# Patient Record
Sex: FEMALE | Race: BLACK OR AFRICAN AMERICAN | Employment: FULL TIME | URBAN - METROPOLITAN AREA
[De-identification: names, ages, dates, MRNs, and addresses within clinical notes are randomized per-mention and may not be internally consistent; named-entity substitution may affect disease eponyms.]

---

## 2017-01-03 ENCOUNTER — TELEPHONE (OUTPATIENT)
Dept: CARDIOLOGY CLINIC | Age: 47
End: 2017-01-03

## 2017-01-03 NOTE — TELEPHONE ENCOUNTER
The patient requested a call back regarding a possible medication nebivolol (BYSTOLIC) 5 mg tablet change. She can be reached at [phone]. Thanks!

## 2017-01-10 ENCOUNTER — TELEPHONE (OUTPATIENT)
Dept: CARDIOLOGY CLINIC | Age: 47
End: 2017-01-10

## 2017-01-11 NOTE — TELEPHONE ENCOUNTER
Spoke with Patient,    Advised the Bakaribradyn Abler is requiring a PA [id number]. Will call tomorrow to start. Patient to take samples as directed in the mean time.

## 2017-01-16 ENCOUNTER — TELEPHONE (OUTPATIENT)
Dept: CARDIOLOGY CLINIC | Age: 47
End: 2017-01-16

## 2017-01-16 NOTE — TELEPHONE ENCOUNTER
PA Case # [id number]    Patient has been denied for Bystolic 5 mg. Patient has to try another BB Tier one. Ex Core[employer]. Will reach out to Dr. Nazanin Paula for another option.

## 2017-01-18 NOTE — TELEPHONE ENCOUNTER
Unable to leave  for Patient    MD Bandar Ghosh LPN                     If she can get Toprol XL then start 25 MG PO DAILY

## 2017-02-15 ENCOUNTER — TELEPHONE (OUTPATIENT)
Dept: CARDIOLOGY CLINIC | Age: 47
End: 2017-02-15

## 2017-02-15 RX ORDER — METOPROLOL SUCCINATE 25 MG/1
25 TABLET, EXTENDED RELEASE ORAL DAILY
Qty: 30 TAB | Refills: 3 | Status: SHIPPED | OUTPATIENT
Start: 2017-02-15 | End: 2018-01-24 | Stop reason: SDUPTHER

## 2017-03-06 ENCOUNTER — HOSPITAL ENCOUNTER (OUTPATIENT)
Dept: MAMMOGRAPHY | Age: 47
Discharge: HOME | End: 2017-03-06
Attending: SPECIALIST
Payer: COMMERCIAL

## 2017-03-06 DIAGNOSIS — Z12.31 VISIT FOR SCREENING MAMMOGRAM: ICD-10-CM

## 2017-03-06 PROCEDURE — 77063 BREAST TOMOSYNTHESIS BI: CPT

## 2017-12-04 ENCOUNTER — HOSPITAL ENCOUNTER (EMERGENCY)
Age: 47
Discharge: HOME | End: 2017-12-04
Attending: EMERGENCY MEDICINE
Payer: COMMERCIAL

## 2017-12-04 VITALS
SYSTOLIC BLOOD PRESSURE: 157 MMHG | HEART RATE: 75 BPM | OXYGEN SATURATION: 97 % | TEMPERATURE: 98.7 F | RESPIRATION RATE: 18 BRPM | HEIGHT: 67 IN | WEIGHT: 195.11 LBS | BODY MASS INDEX: 30.62 KG/M2 | DIASTOLIC BLOOD PRESSURE: 102 MMHG

## 2017-12-04 DIAGNOSIS — E86.0 DEHYDRATION: ICD-10-CM

## 2017-12-04 DIAGNOSIS — K52.9 GASTROENTERITIS, ACUTE: Primary | ICD-10-CM

## 2017-12-04 LAB
ALBUMIN SERPL-MCNC: 3.3 G/DL (ref 3.5–5)
ALBUMIN/GLOB SERPL: 0.8 {RATIO} (ref 1.1–2.2)
ALP SERPL-CCNC: 64 U/L (ref 45–117)
ALT SERPL-CCNC: 24 U/L (ref 12–78)
ANION GAP SERPL CALC-SCNC: 9 MMOL/L (ref 5–15)
APPEARANCE UR: CLEAR
AST SERPL-CCNC: 23 U/L (ref 15–37)
BACTERIA URNS QL MICRO: NEGATIVE /HPF
BASOPHILS # BLD: 0.1 K/UL (ref 0–0.1)
BASOPHILS NFR BLD: 1 % (ref 0–1)
BILIRUB SERPL-MCNC: 0.3 MG/DL (ref 0.2–1)
BILIRUB UR QL: NEGATIVE
BUN SERPL-MCNC: 3 MG/DL (ref 6–20)
BUN/CREAT SERPL: 3 (ref 12–20)
CALCIUM SERPL-MCNC: 9 MG/DL (ref 8.5–10.1)
CHLORIDE SERPL-SCNC: 105 MMOL/L (ref 97–108)
CO2 SERPL-SCNC: 26 MMOL/L (ref 21–32)
COLOR UR: ABNORMAL
CREAT SERPL-MCNC: 0.87 MG/DL (ref 0.55–1.02)
DIFFERENTIAL METHOD BLD: NORMAL
EOSINOPHIL # BLD: 0 K/UL (ref 0–0.4)
EOSINOPHIL NFR BLD: 0 % (ref 0–7)
EPITH CASTS URNS QL MICRO: ABNORMAL /LPF
ERYTHROCYTE [DISTWIDTH] IN BLOOD BY AUTOMATED COUNT: 12.9 % (ref 11.5–14.5)
GLOBULIN SER CALC-MCNC: 4.3 G/DL (ref 2–4)
GLUCOSE SERPL-MCNC: 115 MG/DL (ref 65–100)
GLUCOSE UR STRIP.AUTO-MCNC: NEGATIVE MG/DL
HCT VFR BLD AUTO: 40.4 % (ref 35–47)
HGB BLD-MCNC: 13.7 G/DL (ref 11.5–16)
HGB UR QL STRIP: ABNORMAL
HYALINE CASTS URNS QL MICRO: ABNORMAL /LPF (ref 0–5)
KETONES UR QL STRIP.AUTO: NEGATIVE MG/DL
LEUKOCYTE ESTERASE UR QL STRIP.AUTO: NEGATIVE
LIPASE SERPL-CCNC: 90 U/L (ref 73–393)
LYMPHOCYTES # BLD: 2.6 K/UL (ref 0.8–3.5)
LYMPHOCYTES NFR BLD: 31 % (ref 12–49)
MCH RBC QN AUTO: 28.8 PG (ref 26–34)
MCHC RBC AUTO-ENTMCNC: 33.9 G/DL (ref 30–36.5)
MCV RBC AUTO: 85.1 FL (ref 80–99)
MONOCYTES # BLD: 0.5 K/UL (ref 0–1)
MONOCYTES NFR BLD: 6 % (ref 5–13)
NEUTS SEG # BLD: 5.3 K/UL (ref 1.8–8)
NEUTS SEG NFR BLD: 62 % (ref 32–75)
NITRITE UR QL STRIP.AUTO: NEGATIVE
PH UR STRIP: 7 [PH] (ref 5–8)
PLATELET # BLD AUTO: 380 K/UL (ref 150–400)
POTASSIUM SERPL-SCNC: 3.4 MMOL/L (ref 3.5–5.1)
PROT SERPL-MCNC: 7.6 G/DL (ref 6.4–8.2)
PROT UR STRIP-MCNC: NEGATIVE MG/DL
RBC # BLD AUTO: 4.75 M/UL (ref 3.8–5.2)
RBC #/AREA URNS HPF: ABNORMAL /HPF (ref 0–5)
RBC MORPH BLD: NORMAL
SODIUM SERPL-SCNC: 140 MMOL/L (ref 136–145)
SP GR UR REFRACTOMETRY: <1.005 (ref 1–1.03)
UR CULT HOLD, URHOLD: NORMAL
UROBILINOGEN UR QL STRIP.AUTO: 0.2 EU/DL (ref 0.2–1)
WBC # BLD AUTO: 8.5 K/UL (ref 3.6–11)
WBC MORPH BLD: NORMAL
WBC URNS QL MICRO: ABNORMAL /HPF (ref 0–4)

## 2017-12-04 PROCEDURE — 83690 ASSAY OF LIPASE: CPT | Performed by: EMERGENCY MEDICINE

## 2017-12-04 PROCEDURE — 99283 EMERGENCY DEPT VISIT LOW MDM: CPT

## 2017-12-04 PROCEDURE — 96361 HYDRATE IV INFUSION ADD-ON: CPT

## 2017-12-04 PROCEDURE — 74011250636 HC RX REV CODE- 250/636: Performed by: EMERGENCY MEDICINE

## 2017-12-04 PROCEDURE — 85025 COMPLETE CBC W/AUTO DIFF WBC: CPT | Performed by: EMERGENCY MEDICINE

## 2017-12-04 PROCEDURE — 96374 THER/PROPH/DIAG INJ IV PUSH: CPT

## 2017-12-04 PROCEDURE — 36415 COLL VENOUS BLD VENIPUNCTURE: CPT | Performed by: EMERGENCY MEDICINE

## 2017-12-04 PROCEDURE — 81001 URINALYSIS AUTO W/SCOPE: CPT | Performed by: EMERGENCY MEDICINE

## 2017-12-04 PROCEDURE — 80053 COMPREHEN METABOLIC PANEL: CPT | Performed by: EMERGENCY MEDICINE

## 2017-12-04 RX ORDER — SODIUM CHLORIDE 0.9 % (FLUSH) 0.9 %
5-10 SYRINGE (ML) INJECTION EVERY 8 HOURS
Status: DISCONTINUED | OUTPATIENT
Start: 2017-12-04 | End: 2017-12-04 | Stop reason: HOSPADM

## 2017-12-04 RX ORDER — DICYCLOMINE HYDROCHLORIDE 20 MG/1
20 TABLET ORAL EVERY 6 HOURS
Qty: 20 TAB | Refills: 0 | Status: SHIPPED | OUTPATIENT
Start: 2017-12-04 | End: 2017-12-09

## 2017-12-04 RX ORDER — ONDANSETRON 4 MG/1
4 TABLET, ORALLY DISINTEGRATING ORAL
Qty: 10 TAB | Refills: 0 | Status: SHIPPED | OUTPATIENT
Start: 2017-12-04 | End: 2019-05-16

## 2017-12-04 RX ORDER — ONDANSETRON 2 MG/ML
4 INJECTION INTRAMUSCULAR; INTRAVENOUS
Status: COMPLETED | OUTPATIENT
Start: 2017-12-04 | End: 2017-12-04

## 2017-12-04 RX ORDER — SODIUM CHLORIDE 0.9 % (FLUSH) 0.9 %
5-10 SYRINGE (ML) INJECTION AS NEEDED
Status: DISCONTINUED | OUTPATIENT
Start: 2017-12-04 | End: 2017-12-04 | Stop reason: HOSPADM

## 2017-12-04 RX ORDER — DICYCLOMINE HYDROCHLORIDE 10 MG/1
20 CAPSULE ORAL
Status: DISCONTINUED | OUTPATIENT
Start: 2017-12-04 | End: 2017-12-04 | Stop reason: HOSPADM

## 2017-12-04 RX ADMIN — ONDANSETRON 4 MG: 2 INJECTION INTRAMUSCULAR; INTRAVENOUS at 04:28

## 2017-12-04 RX ADMIN — SODIUM CHLORIDE 1000 ML: 900 INJECTION, SOLUTION INTRAVENOUS at 04:28

## 2017-12-04 NOTE — ED TRIAGE NOTES
Patient arrives with c/o diarrhea yesterday, weakness and dizziness today accompanied by night sweats x 2 nights; recent travel to [facility] within the week. Patient states her travel partners have also had the \"[place] tummy bug\" or \"Pharadhaoah's Revenge\".

## 2017-12-04 NOTE — DISCHARGE INSTRUCTIONS
We hope that we have addressed all of your medical concerns. The examination and treatment you received in the Emergency Department were for an emergent problem and were not intended as complete care. It is important that you follow up with your healthcare provider(s) for ongoing care. If your symptoms worsen or do not improve as expected, and you are unable to reach your usual health care provider(s), you should return to the Emergency Department. Today's healthcare is undergoing tremendous change, and patient satisfaction surveys are one of the many tools to assess the quality of medical care. You may receive a survey from the [employer] regarding your experience in the Emergency Department. I hope that your experience has been completely positive, particularly the medical care that I provided. As such, please participate in the survey; anything less than excellent does not meet my expectations or intentions. [facility], later [facility] and [facility] participate in nationally recognized quality of care measures. If your blood pressure is greater than 120/80, as reported below, we urge that you seek medical care to address the potential of high blood pressure, commonly known as hypertension. Hypertension can be hereditary or can be caused by certain medical conditions, pain, stress, or \"white coat syndrome. \"       Please make an appointment with your health care provider(s) for follow up of your Emergency Department visit. VITALS:   Patient Vitals for the past 8 hrs:   Temp Pulse Resp BP SpO2   12/04/17 0545 - - - (!) 157/102 -   12/04/17 0530 - - - (!) 155/104 -   12/04/17 0515 - - - (!) 169/97 -   12/04/17 0500 - - - (!) 159/94 -   12/04/17 0445 - - - (!) 162/91 -   12/04/17 0355 98.7 °F (37.1 °C) 75 18 (!) 199/101 97 %          Thank you for allowing us to provide you with medical care today.   We realize that you have many choices for your emergency care needs. Please choose us in the future for any continued health care needs. Mónica Hernández [address] [facility] [place]: [phone]            Recent Results (from the past 24 hour(s))   METABOLIC PANEL, COMPREHENSIVE    Collection Time: 12/04/17  4:14 AM   Result Value Ref Range    Sodium 140 136 - 145 mmol/L    Potassium 3.4 (L) 3.5 - 5.1 mmol/L    Chloride 105 97 - 108 mmol/L    CO2 26 21 - 32 mmol/L    Anion gap 9 5 - 15 mmol/L    Glucose 115 (H) 65 - 100 mg/dL    BUN 3 (L) 6 - 20 MG/DL    Creatinine 0.87 0.55 - 1.02 MG/DL    BUN/Creatinine ratio 3 (L) 12 - 20      GFR est AA >60 >60 ml/min/1.73m2    GFR est non-AA >60 >60 ml/min/1.73m2    Calcium 9.0 8.5 - 10.1 MG/DL    Bilirubin, total 0.3 0.2 - 1.0 MG/DL    ALT (SGPT) 24 12 - 78 U/L    AST (SGOT) 23 15 - 37 U/L    Alk. phosphatase 64 45 - 117 U/L    Protein, total 7.6 6.4 - 8.2 g/dL    Albumin 3.3 (L) 3.5 - 5.0 g/dL    Globulin 4.3 (H) 2.0 - 4.0 g/dL    A-G Ratio 0.8 (L) 1.1 - 2.2     CBC WITH AUTOMATED DIFF    Collection Time: 12/04/17  4:14 AM   Result Value Ref Range    WBC 8.5 3.6 - 11.0 K/uL    RBC 4.75 3.80 - 5.20 M/uL    HGB 13.7 11.5 - 16.0 g/dL    HCT 40.4 35.0 - 47.0 %    MCV 85.1 80.0 - 99.0 FL    MCH 28.8 26.0 - 34.0 PG    MCHC 33.9 30.0 - 36.5 g/dL    RDW 12.9 11.5 - 14.5 %    PLATELET 468 220 - 117 K/uL    NEUTROPHILS 62 32 - 75 %    LYMPHOCYTES 31 12 - 49 %    MONOCYTES 6 5 - 13 %    EOSINOPHILS 0 0 - 7 %    BASOPHILS 1 0 - 1 %    ABS. NEUTROPHILS 5.3 1.8 - 8.0 K/UL    ABS. LYMPHOCYTES 2.6 0.8 - 3.5 K/UL    ABS. MONOCYTES 0.5 0.0 - 1.0 K/UL    ABS. EOSINOPHILS 0.0 0.0 - 0.4 K/UL    ABS.  BASOPHILS 0.1 0.0 - 0.1 K/UL    DF SMEAR SCANNED      RBC COMMENTS NORMOCYTIC, NORMOCHROMIC      WBC COMMENTS ATYPICAL LYMPHOCYTES PRESENT     LIPASE    Collection Time: 12/04/17  4:14 AM   Result Value Ref Range    Lipase 90 73 - 393 U/L   URINALYSIS W/MICROSCOPIC    Collection Time: 12/04/17  4:15 AM   Result Value Ref Range    Color YELLOW/STRAW      Appearance CLEAR CLEAR      Specific gravity <1.005 1.003 - 1.030    pH (UA) 7.0 5.0 - 8.0      Protein NEGATIVE  NEG mg/dL    Glucose NEGATIVE  NEG mg/dL    Ketone NEGATIVE  NEG mg/dL    Bilirubin NEGATIVE  NEG      Blood MODERATE (A) NEG      Urobilinogen 0.2 0.2 - 1.0 EU/dL    Nitrites NEGATIVE  NEG      Leukocyte Esterase NEGATIVE  NEG      WBC 0-4 0 - 4 /hpf    RBC 5-10 0 - 5 /hpf    Epithelial cells FEW FEW /lpf    Bacteria NEGATIVE  NEG /hpf    Hyaline cast 0-2 0 - 5 /lpf   URINE CULTURE HOLD SAMPLE    Collection Time: 12/04/17  4:15 AM   Result Value Ref Range    Urine culture hold URINE ON HOLD IN MICROBIOLOGY DEPT FOR 3 DAYS         No results found. Gastroenteritis: Care Instructions  Your Care Instructions    Gastroenteritis is an illness that may cause nausea, vomiting, and diarrhea. It is sometimes called \"stomach flu. \" It can be caused by bacteria or a virus. You will probably begin to feel better in 1 to 2 days. In the meantime, get plenty of rest and make sure you do not become dehydrated. Dehydration occurs when your body loses too much fluid. Follow-up care is a key part of your treatment and safety. Be sure to make and go to all appointments, and call your doctor if you are having problems. It's also a good idea to know your test results and keep a list of the medicines you take. How can you care for yourself at home? · If your doctor prescribed antibiotics, take them as directed. Do not stop taking them just because you feel better. You need to take the full course of antibiotics. · Drink plenty of fluids to prevent dehydration, enough so that your urine is light yellow or clear like water. Choose water and other caffeine-free clear liquids until you feel better. If you have kidney, heart, or liver disease and have to limit fluids, talk with your doctor before you increase your fluid intake.   · Drink fluids slowly, in frequent, small amounts, because drinking too much too fast can cause vomiting. · Begin eating mild foods, such as dry toast, yogurt, applesauce, bananas, and rice. Avoid spicy, hot, or high-fat foods, and do not drink alcohol or caffeine for a day or two. Do not drink milk or eat ice cream until you are feeling better. How to prevent gastroenteritis  · Keep hot foods hot and cold foods cold. · Do not eat meats, dressings, salads, or other foods that have been kept at room temperature for more than 2 hours. · Use a thermometer to check your refrigerator. It should be between 34°F and 40°F.  · Defrost meats in the refrigerator or microwave, not on the kitchen counter. · Keep your hands and your kitchen clean. Wash your hands, cutting boards, and countertops with hot soapy water frequently. · Cook meat until it is well done. · Do not eat raw eggs or uncooked sauces made with raw eggs. · Do not take chances. If food looks or tastes spoiled, throw it out. When should you call for help? Call 911 anytime you think you may need emergency care. For example, call if:  ? · You vomit blood or what looks like coffee grounds. ? · You passed out (lost consciousness). ? · You pass maroon or very bloody stools. ?Call your doctor now or seek immediate medical care if:  ? · You have severe belly pain. ? · You have signs of needing more fluids. You have sunken eyes, a dry mouth, and pass only a little dark urine. ? · You feel like you are going to faint. ? · You have increased belly pain that does not go away in 1 to 2 days. ? · You have new or increased nausea, or you are vomiting. ? · You have a new or higher fever. ? · Your stools are black and tarlike or have streaks of blood. ? Watch closely for changes in your health, and be sure to contact your doctor if:  ? · You are dizzy or lightheaded. ? · You urinate less than usual, or your urine is dark yellow or brown.    ? · You do not feel better with each day that goes by. Where can you learn more? Go to http://brenda-katy.info/. Enter N142 in the search box to learn more about \"Gastroenteritis: Care Instructions. \"  Current as of: March 3, 2017  Content Version: 11.4  © 9800-9312 [employer]. Care instructions adapted under license by [employer] (which disclaims liability or warranty for this information). If you have questions about a medical condition or this instruction, always ask your healthcare professional. [place] [address] any warranty or liability for your use of this information. Dehydration: Care Instructions  Your Care Instructions  Dehydration happens when your body loses too much fluid. This might happen when you do not drink enough water or you lose large amounts of fluids from your body because of diarrhea, vomiting, or sweating. Severe dehydration can be life-threatening. Water and minerals called electrolytes help put your body fluids back in balance. Learn the early signs of fluid loss, and drink more fluids to prevent dehydration. Follow-up care is a key part of your treatment and safety. Be sure to make and go to all appointments, and call your doctor if you are having problems. It's also a good idea to know your test results and keep a list of the medicines you take. How can you care for yourself at home? · To prevent dehydration, drink plenty of fluids, enough so that your urine is light yellow or clear like water. Choose water and other caffeine-free clear liquids until you feel better. If you have kidney, heart, or liver disease and have to limit fluids, talk with your doctor before you increase the amount of fluids you drink. · If you do not feel like eating or drinking, try taking small sips of water, sports drinks, or other rehydration drinks.   · Get plenty of rest.  To prevent dehydration  · Add more fluids to your diet and daily routine, unless your doctor has told you not to. · During hot weather, drink more fluids. Drink even more fluids if you exercise a lot. Stay away from drinks with alcohol or caffeine. · Watch for the symptoms of dehydration. These include:  ¨ A dry, sticky mouth. ¨ Dark yellow urine, and not much of it. ¨ Dry and sunken eyes. ¨ Feeling very tired. · Learn what problems can lead to dehydration. These include:  ¨ Diarrhea, fever, and vomiting. ¨ Any illness with a fever, such as pneumonia or the flu. ¨ Activities that cause heavy sweating, such as endurance races and heavy outdoor work in hot or humid weather. ¨ Alcohol or drug abuse or withdrawal.  ¨ Certain medicines, such as cold and allergy pills (antihistamines), diet pills (diuretics), and laxatives. ¨ Certain diseases, such as diabetes, cancer, and heart or kidney disease. When should you call for help? Call 911 anytime you think you may need emergency care. For example, call if:  ? · You passed out (lost consciousness). ?Call your doctor now or seek immediate medical care if:  ? · You are confused and cannot think clearly. ? · You are dizzy or lightheaded, or you feel like you may faint. ? · You have signs of needing more fluids. You have sunken eyes and a dry mouth, and you pass only a little dark urine. ? · You cannot keep fluids down. ? Watch closely for changes in your health, and be sure to contact your doctor if:  ? · You are not making tears. ? · Your skin is very dry and sags slowly back into place after you pinch it. ? · Your mouth and eyes are very dry. Where can you learn more? Go to http://brenda-katy.info/. Enter Y671 in the search box to learn more about \"Dehydration: Care Instructions. \"  Current as of: March 20, 2017  Content Version: 11.4  © 7245-0407 [employer]. Care instructions adapted under license by [employer] (which disclaims liability or warranty for this information).  If you have questions about a medical condition or this instruction, always ask your healthcare professional. [address] any warranty or liability for your use of this information. Oral Rehydration: Care Instructions  Your Care Instructions    Dehydration occurs when your body loses too much water. This can happen if you do not drink enough fluids or lose a lot of fluid due to diarrhea, vomiting, or sweating. Being dehydrated can cause health problems and can even be life-threatening. To replace lost fluids, you need to drink liquid that contains special chemicals called electrolytes. Electrolytes keep your body working well. Plain water does not have electrolytes. You also need to rest to prevent more fluid loss. Replacing water and electrolytes (oral rehydration) completely takes about 36 hours. But you should feel better within a few hours. Follow-up care is a key part of your treatment and safety. Be sure to make and go to all appointments, and call your doctor if you are having problems. It's also a good idea to know your test results and keep a list of the medicines you take. How can you care for yourself at home? · Take frequent sips of a drink such as Gatorade, Powerade, or other rehydration drinks that your doctor suggests. These replace both fluid and important chemicals (electrolytes) you need for balance in your blood. · Drink 2 quarts of cool liquid over 2 to 4 hours. You should have at least 10 glasses of liquid a day to replace lost fluid. If you have kidney, heart, or liver disease and have to limit fluids, talk with your doctor before you increase the amount of fluids you drink. · Make your own drink. Measure everything carefully. The drink may not work well or may even be harmful if the amounts are off. ¨ 1 quart water  ¨ ½ teaspoon salt  ¨ 6 teaspoons sugar  · Do not drink liquid with caffeine, such as coffee and esteban. · Do not drink any alcohol.  It can make you dehydrated. · Drink plenty of fluids, enough so that your urine is light yellow or clear like water. If you have kidney, heart, or liver disease and have to limit fluids, talk with your doctor before you increase the amount of fluids you drink. When should you call for help? Call 911 anytime you think you may need emergency care. For example, call if:  ? · You have signs of severe dehydration, such as:  ¨ You are confused or unable to stay awake. ¨ You passed out (lost consciousness). ?Call your doctor now or seek immediate medical care if:  ? · You still have signs of dehydration. You have sunken eyes and a dry mouth, and you pass only a little dark urine. ? · You are dizzy or lightheaded, or you feel like you may faint. ? · You are not able to keep down fluids. ? Watch closely for changes in your health, and be sure to contact your doctor if:  ? · You do not get better as expected. Where can you learn more? Go to http://brenda-katy.info/. Enter I040 in the search box to learn more about \"Oral Rehydration: Care Instructions. \"  Current as of: March 20, 2017  Content Version: 11.4  © 0789-6819 [employer]. Care instructions adapted under license by [employer] (which disclaims liability or warranty for this information). If you have questions about a medical condition or this instruction, always ask your healthcare professional. [place] [address] any warranty or liability for your use of this information.

## 2018-01-24 ENCOUNTER — OFFICE VISIT (OUTPATIENT)
Dept: CARDIOLOGY CLINIC | Age: 48
End: 2018-01-24

## 2018-01-24 VITALS
RESPIRATION RATE: 16 BRPM | HEIGHT: 67 IN | SYSTOLIC BLOOD PRESSURE: 138 MMHG | WEIGHT: 192 LBS | HEART RATE: 69 BPM | DIASTOLIC BLOOD PRESSURE: 76 MMHG | OXYGEN SATURATION: 97 % | BODY MASS INDEX: 30.13 KG/M2

## 2018-01-24 DIAGNOSIS — R03.0 PRE-HYPERTENSION: Primary | ICD-10-CM

## 2018-01-24 RX ORDER — METOPROLOL SUCCINATE 25 MG/1
25 TABLET, EXTENDED RELEASE ORAL DAILY
Qty: 30 TAB | Refills: 3 | Status: SHIPPED | OUTPATIENT
Start: 2018-01-24 | End: 2018-06-18 | Stop reason: SDUPTHER

## 2018-01-24 RX ORDER — METOPROLOL SUCCINATE 25 MG/1
TABLET, EXTENDED RELEASE ORAL
Qty: 30 TAB | Refills: 3 | Status: SHIPPED | OUTPATIENT
Start: 2018-01-24 | End: 2021-02-01

## 2018-01-24 NOTE — PROGRESS NOTES
Mark Kellogg is a 41 y.o. female  Chief Complaint   Patient presents with    Hypertension    Medication Refill     1. Have you been to the ER, urgent care clinic since your last visit? Hospitalized since your last visit? Yes, [facility]   12/4/2017 Gastroenteritis, dehydration      2. Have you seen or consulted any other health care providers outside of the [address] [place] [address] since your last visit? Include any pap smears or colon screening.  [facility] - PCP

## 2018-01-24 NOTE — PROGRESS NOTES
Office Follow-up    NAME: Adelina Hernández   :  1970  MRM:  [id number]    Date:  2018            Assessment:     Problem List  Date Reviewed: 2018          Codes Class Noted    Pre-hypertension ICD-10-CM: R03.0  ICD-9-CM: 796.2  2015                 Plan:     1. Hypertension: Blood pressure is controlled. Continue current medications. Refill medications. Low-salt diet with weight loss focus. This time again I discussed about her stopping the oral contraceptives however she is reluctant. 2. Follow-up with me as needed. Subjective:     Adelina Hernández, a 41y.o. year-old who presents for followup. She has known history of hypertension. She is doing well without any major complaints. She continues to take oral contraceptives. Her blood pressure is controlled. She exercising on a regular basis. She does not smoke tobacco.    Exam:     Physical Exam:  Visit Vitals    /76 (BP 1 Location: Left arm, BP Patient Position: Sitting)    Pulse 69    Resp 16    Ht 5' 7\" (1.702 m)    Wt 192 lb (87.1 kg)    SpO2 97%    BMI 30.07 kg/m2     General appearance - alert, well appearing, and in no distress  Mental status - affect appropriate to mood  Eyes - sclera anicteric, moist mucous membranes  Neck - supple, no significant adenopathy  Chest - clear to auscultation, no wheezes, rales or rhonchi  Heart - normal rate, regular rhythm, normal S1, S2, no murmurs, rubs, clicks or gallops      Medications:     Current Outpatient Prescriptions   Medication Sig    metoprolol succinate (TOPROL-XL) 25 mg XL tablet Take 1 Tab by mouth daily.  ibuprofen (MOTRIN) 200 mg tablet Take 400 mg by mouth every six (6) hours as needed for Pain.  DESOGESTREL-ETHINYL ESTRADIOL (APRI PO) Take  by mouth.  B.infantis-B.ani-B.long-B.bifi (PROBIOTIC 4X) 10-15 mg TbEC Take  by mouth.     ondansetron (ZOFRAN ODT) 4 mg disintegrating tablet Take 1 Tab by mouth every eight (8) hours as needed for Nausea. No current facility-administered medications for this visit. Diagnostic Data Review:     EKG: normal sinus rhythm, normal ST segment, normal axis, normal intervals. No specialty comments available. Lab Review:   No results found for: CHOL, CHOLX, CHLST, CHOLV, 994024, HDL, LDL, LDLC, DLDLP, TGLX, TRIGL, TRIGP, CHHD, CHHDX  Lab Results   Component Value Date/Time    Creatinine 0.87 12/04/2017 04:14 AM     Lab Results   Component Value Date/Time    BUN 3 12/04/2017 04:14 AM     Lab Results   Component Value Date/Time    Potassium 3.4 12/04/2017 04:14 AM     No results found for: HBA1C, HGBE8, HAV2SERK  Lab Results   Component Value Date/Time    HGB 13.7 12/04/2017 04:14 AM     Lab Results   Component Value Date/Time    PLATELET 248 56/43/8915 04:14 AM     No results for input(s): CPK, CKMB, TROIQ in the last 72 hours. No lab exists for component: CKQMB, CPKMB             ___________________________________________________    Nikki Montes.  Artis Fleming MD, [facility]

## 2018-04-27 ENCOUNTER — HOSPITAL ENCOUNTER (OUTPATIENT)
Dept: MAMMOGRAPHY | Age: 48
Discharge: HOME | End: 2018-04-27
Attending: SPECIALIST
Payer: COMMERCIAL

## 2018-04-27 DIAGNOSIS — Z12.39 SCREENING BREAST EXAMINATION: ICD-10-CM

## 2018-04-27 PROCEDURE — 77063 BREAST TOMOSYNTHESIS BI: CPT

## 2018-06-18 RX ORDER — METOPROLOL SUCCINATE 25 MG/1
25 TABLET, EXTENDED RELEASE ORAL DAILY
Qty: 90 TAB | Refills: 3 | Status: SHIPPED | OUTPATIENT
Start: 2018-06-18 | End: 2019-07-01 | Stop reason: SDUPTHER

## 2019-05-16 ENCOUNTER — HOSPITAL ENCOUNTER (EMERGENCY)
Age: 49
Discharge: HOME | End: 2019-05-16
Attending: EMERGENCY MEDICINE
Payer: COMMERCIAL

## 2019-05-16 VITALS
HEART RATE: 84 BPM | DIASTOLIC BLOOD PRESSURE: 82 MMHG | HEIGHT: 67 IN | WEIGHT: 204 LBS | RESPIRATION RATE: 15 BRPM | SYSTOLIC BLOOD PRESSURE: 142 MMHG | BODY MASS INDEX: 32.02 KG/M2 | TEMPERATURE: 98.5 F | OXYGEN SATURATION: 99 %

## 2019-05-16 DIAGNOSIS — R11.2 INTRACTABLE VOMITING WITH NAUSEA, UNSPECIFIED VOMITING TYPE: Primary | ICD-10-CM

## 2019-05-16 DIAGNOSIS — R19.7 DIARRHEA, UNSPECIFIED TYPE: ICD-10-CM

## 2019-05-16 LAB
ANION GAP SERPL CALC-SCNC: 7 MMOL/L (ref 5–15)
BASOPHILS # BLD: 0.1 K/UL (ref 0–0.1)
BASOPHILS NFR BLD: 1 % (ref 0–1)
BUN SERPL-MCNC: 13 MG/DL (ref 6–20)
BUN/CREAT SERPL: 12 (ref 12–20)
CALCIUM SERPL-MCNC: 8.6 MG/DL (ref 8.5–10.1)
CHLORIDE SERPL-SCNC: 107 MMOL/L (ref 97–108)
CO2 SERPL-SCNC: 22 MMOL/L (ref 21–32)
COMMENT, HOLDF: NORMAL
CREAT SERPL-MCNC: 1.05 MG/DL (ref 0.55–1.02)
DIFFERENTIAL METHOD BLD: ABNORMAL
EOSINOPHIL # BLD: 0.2 K/UL (ref 0–0.4)
EOSINOPHIL NFR BLD: 2 % (ref 0–7)
ERYTHROCYTE [DISTWIDTH] IN BLOOD BY AUTOMATED COUNT: 12.9 % (ref 11.5–14.5)
GLUCOSE SERPL-MCNC: 153 MG/DL (ref 65–100)
HCG UR QL: NEGATIVE
HCT VFR BLD AUTO: 39.1 % (ref 35–47)
HGB BLD-MCNC: 12.6 G/DL (ref 11.5–16)
IMM GRANULOCYTES # BLD AUTO: 0.1 K/UL (ref 0–0.04)
IMM GRANULOCYTES NFR BLD AUTO: 0 % (ref 0–0.5)
LYMPHOCYTES # BLD: 4.6 K/UL (ref 0.8–3.5)
LYMPHOCYTES NFR BLD: 36 % (ref 12–49)
MCH RBC QN AUTO: 28.5 PG (ref 26–34)
MCHC RBC AUTO-ENTMCNC: 32.2 G/DL (ref 30–36.5)
MCV RBC AUTO: 88.5 FL (ref 80–99)
MONOCYTES # BLD: 0.7 K/UL (ref 0–1)
MONOCYTES NFR BLD: 6 % (ref 5–13)
NEUTS SEG # BLD: 7.1 K/UL (ref 1.8–8)
NEUTS SEG NFR BLD: 55 % (ref 32–75)
NRBC # BLD: 0 K/UL (ref 0–0.01)
NRBC BLD-RTO: 0 PER 100 WBC
PLATELET # BLD AUTO: 329 K/UL (ref 150–400)
PMV BLD AUTO: 9.4 FL (ref 8.9–12.9)
POTASSIUM SERPL-SCNC: 3.5 MMOL/L (ref 3.5–5.1)
RBC # BLD AUTO: 4.42 M/UL (ref 3.8–5.2)
SAMPLES BEING HELD,HOLD: NORMAL
SODIUM SERPL-SCNC: 136 MMOL/L (ref 136–145)
WBC # BLD AUTO: 12.7 K/UL (ref 3.6–11)

## 2019-05-16 PROCEDURE — 99283 EMERGENCY DEPT VISIT LOW MDM: CPT

## 2019-05-16 PROCEDURE — 85025 COMPLETE CBC W/AUTO DIFF WBC: CPT

## 2019-05-16 PROCEDURE — 36415 COLL VENOUS BLD VENIPUNCTURE: CPT

## 2019-05-16 PROCEDURE — 74011250637 HC RX REV CODE- 250/637: Performed by: EMERGENCY MEDICINE

## 2019-05-16 PROCEDURE — 80048 BASIC METABOLIC PNL TOTAL CA: CPT

## 2019-05-16 PROCEDURE — 81025 URINE PREGNANCY TEST: CPT

## 2019-05-16 PROCEDURE — 96374 THER/PROPH/DIAG INJ IV PUSH: CPT

## 2019-05-16 PROCEDURE — 74011250636 HC RX REV CODE- 250/636: Performed by: EMERGENCY MEDICINE

## 2019-05-16 RX ORDER — ONDANSETRON 2 MG/ML
4 INJECTION INTRAMUSCULAR; INTRAVENOUS
Status: COMPLETED | OUTPATIENT
Start: 2019-05-16 | End: 2019-05-16

## 2019-05-16 RX ORDER — LOPERAMIDE HCL 2 MG
2 TABLET ORAL
Qty: 12 TAB | Refills: 0 | Status: SHIPPED | OUTPATIENT
Start: 2019-05-16 | End: 2019-05-19

## 2019-05-16 RX ORDER — ONDANSETRON 8 MG/1
8 TABLET, ORALLY DISINTEGRATING ORAL
Qty: 12 TAB | Refills: 0 | Status: SHIPPED | OUTPATIENT
Start: 2019-05-16 | End: 2021-01-25 | Stop reason: ALTCHOICE

## 2019-05-16 RX ORDER — LOPERAMIDE HYDROCHLORIDE 2 MG/1
4 CAPSULE ORAL
Status: COMPLETED | OUTPATIENT
Start: 2019-05-16 | End: 2019-05-16

## 2019-05-16 RX ADMIN — ONDANSETRON 4 MG: 2 INJECTION INTRAMUSCULAR; INTRAVENOUS at 23:04

## 2019-05-16 RX ADMIN — LOPERAMIDE HYDROCHLORIDE 4 MG: 2 CAPSULE ORAL at 23:03

## 2019-05-17 NOTE — ED TRIAGE NOTES
Patients reports nausea and vomiting with diarrhea and feeling flushed and weak about 30 minutes after eating dinner. Denies eating any new foods.

## 2019-05-17 NOTE — ED PROVIDER NOTES
57 y.o. female with past medical history significant for HTN presents ambulatory with chief complaint of nausea, vomiting, diarrhea, and generalized weakness, onset just PTA s/p eating CAVA. Pt explains that she had a mediteranean meal at the restaurant that consisted of chicken, meatballs, orion lettuce, and hibiscus tea, noting that she began experiencing her symptoms about 30 minutes later. Pt further reports feeling hot flashes that started in her abd and radiated down her BLE. She indicates that she vomited twice and had about 3 episodes of diarrhea in the ED. LMP on 04/12/19. Of note, the pt includes that she occasionally misses her BP medication doses. Pt denies any abd pain, urinary symptoms, fevers, chills, or any other symptoms at this time. There are no other acute medical concerns at this time. Social hx: Patient denies Tobacco use. Reports occasional EtOH use. Denies illicit drug abuse. PCP: None Note written by Meek Merrill, as dictated by Pavan Luque MD 10:55 PM 
 
 
The history is provided by the patient. No  was used. No past medical history on file. No past surgical history on file. Family History:  
Problem Relation Age of Onset  Hypertension Mother  Cancer Mother  Diabetes Mother  Breast Cancer Mother 48  High Cholesterol Father  Breast Cancer Maternal Grandmother 76s Social History Socioeconomic History  Marital status: SINGLE Spouse name: Not on file  Number of children: Not on file  Years of education: Not on file  Highest education level: Not on file Occupational History  Not on file Social Needs  Financial resource strain: Not on file  Food insecurity:  
  Worry: Not on file Inability: Not on file  Transportation needs:  
  Medical: Not on file Non-medical: Not on file Tobacco Use  Smoking status: Never Smoker  Smokeless tobacco: Never Used Substance and Sexual Activity  Alcohol use: Yes Comment: occ  Drug use: No  
 Sexual activity: Not on file Lifestyle  Physical activity:  
  Days per week: Not on file Minutes per session: Not on file  Stress: Not on file Relationships  Social connections:  
  Talks on phone: Not on file Gets together: Not on file Attends Sabianist service: Not on file Active member of club or organization: Not on file Attends meetings of clubs or organizations: Not on file Relationship status: Not on file  Intimate partner violence:  
  Fear of current or ex partner: Not on file Emotionally abused: Not on file Physically abused: Not on file Forced sexual activity: Not on file Other Topics Concern  Not on file Social History Narrative  Not on file ALLERGIES: Patient has no known allergies. Review of Systems Constitutional: Negative for chills and fever. HENT: Negative for ear pain and sore throat. Eyes: Negative for pain. Respiratory: Negative for chest tightness and shortness of breath. Cardiovascular: Negative for chest pain and leg swelling. Gastrointestinal: Positive for diarrhea, nausea and vomiting. Negative for abdominal pain. Genitourinary: Negative for difficulty urinating, dysuria, flank pain, frequency and hematuria. Musculoskeletal: Negative for back pain. Skin: Negative for rash. Neurological: Positive for weakness (generalized). Negative for dizziness and headaches. All other systems reviewed and are negative. Vitals:  
 05/16/19 2113 BP: (!) 182/97 Pulse: 89 Resp: 16 Temp: 98.3 °F (36.8 °C) SpO2: 99% Weight: 92.5 kg (204 lb) Height: 5' 7\" (1.702 m) Physical Exam  
Constitutional: No distress. HENT:  
Head: Normocephalic and atraumatic.   
Mouth/Throat: Oropharynx is clear and moist.  
 Eyes: Pupils are equal, round, and reactive to light. Conjunctivae are normal. No scleral icterus. Neck: Neck supple. No tracheal deviation present. Cardiovascular: Normal rate, regular rhythm and intact distal pulses. Pulmonary/Chest: Effort normal. No respiratory distress. She has no wheezes. She has no rales. Abdominal: Soft. She exhibits no distension. There is no tenderness. Genitourinary:  
Genitourinary Comments: deferred Musculoskeletal: She exhibits no edema or deformity. Neurological: She is alert. Skin: Skin is warm and dry. Psychiatric: She has a normal mood and affect. Nursing note and vitals reviewed. Note written by Roque Bravo, as dictated by Kylie Cherry., MD 10:55 PM  
 
MDM Number of Diagnoses or Management Options Diarrhea, unspecified type: Intractable vomiting with nausea, unspecified vomiting type:  
Diagnosis management comments: 72-year-old female with flushing, vomiting, diarrhea 30 minutes after eating out at a restaurant. Electrolytes normal. Tolerating p.o. Prescribed Zofran and Imodium. Advised to followup with primary care doctor. Given return precautions. Procedures Funmi Resendiz.  Duy Barrett MD

## 2019-05-17 NOTE — DISCHARGE INSTRUCTIONS
Patient Education        Diarrhea: Care Instructions  Your Care Instructions    Diarrhea is loose, watery stools (bowel movements). The exact cause is often hard to find. Sometimes diarrhea is your body's way of getting rid of what caused an upset stomach. Viruses, food poisoning, and many medicines can cause diarrhea. Some people get diarrhea in response to emotional stress, anxiety, or certain foods. Almost everyone has diarrhea now and then. It usually isn't serious, and your stools will return to normal soon. The important thing to do is replace the fluids you have lost, so you can prevent dehydration. The doctor has checked you carefully, but problems can develop later. If you notice any problems or new symptoms, get medical treatment right away. Follow-up care is a key part of your treatment and safety. Be sure to make and go to all appointments, and call your doctor if you are having problems. It's also a good idea to know your test results and keep a list of the medicines you take. How can you care for yourself at home? · Watch for signs of dehydration, which means your body has lost too much water. Dehydration is a serious condition and should be treated right away. Signs of dehydration are:  ? Increasing thirst and dry eyes and mouth. ? Feeling faint or lightheaded. ? Darker urine, and a smaller amount of urine than normal.  · To prevent dehydration, drink plenty of fluids, enough so that your urine is light yellow or clear like water. Choose water and other caffeine-free clear liquids until you feel better. If you have kidney, heart, or liver disease and have to limit fluids, talk with your doctor before you increase the amount of fluids you drink. · Begin eating small amounts of mild foods the next day, if you feel like it. ? Try yogurt that has live cultures of Lactobacillus. (Check the label.)  ?  Avoid spicy foods, fruits, alcohol, and caffeine until 48 hours after all symptoms are gone.  ? Avoid chewing gum that contains sorbitol. ? Avoid dairy products (except for yogurt with Lactobacillus) while you have diarrhea and for 3 days after symptoms are gone. · The doctor may recommend that you take over-the-counter medicine, such as loperamide (Imodium), if you still have diarrhea after 6 hours. Read and follow all instructions on the label. Do not use this medicine if you have bloody diarrhea, a high fever, or other signs of serious illness. Call your doctor if you think you are having a problem with your medicine. When should you call for help? Call 911 anytime you think you may need emergency care. For example, call if:    · You passed out (lost consciousness).     · Your stools are maroon or very bloody.    Call your doctor now or seek immediate medical care if:    · You are dizzy or lightheaded, or you feel like you may faint.     · Your stools are black and look like tar, or they have streaks of blood.     · You have new or worse belly pain.     · You have symptoms of dehydration, such as:  ? Dry eyes and a dry mouth. ? Passing only a little dark urine. ? Feeling thirstier than usual.     · You have a new or higher fever.    Watch closely for changes in your health, and be sure to contact your doctor if:    · Your diarrhea is getting worse.     · You see pus in the diarrhea.     · You are not getting better after 2 days (48 hours). Where can you learn more? Go to http://brenda-katy.info/. Enter V988 in the search box to learn more about \"Diarrhea: Care Instructions. \"  Current as of: September 23, 2018  Content Version: 11.9  © 4187-4097 [employer]. Care instructions adapted under license by [facility] (which disclaims liability or warranty for this information).  If you have questions about a medical condition or this instruction, always ask your healthcare professional. [facility] any warranty or liability for your use of this information. Patient Education        Nausea and Vomiting: Care Instructions  Your Care Instructions    When you are nauseated, you may feel weak and sweaty and notice a lot of saliva in your mouth. Nausea often leads to vomiting. Most of the time you do not need to worry about nausea and vomiting, but they can be signs of other illnesses. Two common causes of nausea and vomiting are stomach flu and food poisoning. Nausea and vomiting from viral stomach flu will usually start to improve within 24 hours. Nausea and vomiting from food poisoning may last from 12 to 48 hours. The doctor has checked you carefully, but problems can develop later. If you notice any problems or new symptoms, get medical treatment right away. Follow-up care is a key part of your treatment and safety. Be sure to make and go to all appointments, and call your doctor if you are having problems. It's also a good idea to know your test results and keep a list of the medicines you take. How can you care for yourself at home? · To prevent dehydration, drink plenty of fluids, enough so that your urine is light yellow or clear like water. Choose water and other caffeine-free clear liquids until you feel better. If you have kidney, heart, or liver disease and have to limit fluids, talk with your doctor before you increase the amount of fluids you drink. · Rest in bed until you feel better. · When you are able to eat, try clear soups, mild foods, and liquids until all symptoms are gone for 12 to 48 hours. Other good choices include dry toast, crackers, cooked cereal, and gelatin dessert, such as Jell-O. When should you call for help? Call 911 anytime you think you may need emergency care. For example, call if:    · You passed out (lost consciousness).    Call your doctor now or seek immediate medical care if:    · You have symptoms of dehydration, such as:  ? Dry eyes and a dry mouth.   ? Passing only a little dark urine.  ? Feeling thirstier than usual.     · You have new or worsening belly pain.     · You have a new or higher fever.     · You vomit blood or what looks like coffee grounds.    Watch closely for changes in your health, and be sure to contact your doctor if:    · You have ongoing nausea and vomiting.     · Your vomiting is getting worse.     · Your vomiting lasts longer than 2 days.     · You are not getting better as expected. Where can you learn more? Go to http://brenda-katy.info/. Enter 25 [id number] in the search box to learn more about \"Nausea and Vomiting: Care Instructions. \"  Current as of: September 23, 2018  Content Version: 11.9  © 2956-8872 [employer]. Care instructions adapted under license by [employer] (which disclaims liability or warranty for this information). If you have questions about a medical condition or this instruction, always ask your healthcare professional. [address] any warranty or liability for your use of this information.

## 2019-05-17 NOTE — ED NOTES
Patient discharged from ED by provider. Discharge instructions reviewed with patient and understanding verbalized. Patient ambulatory from ED in [facility].

## 2019-06-05 ENCOUNTER — HOSPITAL ENCOUNTER (OUTPATIENT)
Dept: MAMMOGRAPHY | Age: 49
Discharge: HOME | End: 2019-06-05
Attending: SPECIALIST
Payer: COMMERCIAL

## 2019-06-05 DIAGNOSIS — Z12.39 SCREENING BREAST EXAMINATION: ICD-10-CM

## 2019-06-05 PROCEDURE — 77063 BREAST TOMOSYNTHESIS BI: CPT

## 2019-07-01 RX ORDER — METOPROLOL SUCCINATE 25 MG/1
TABLET, EXTENDED RELEASE ORAL
Qty: 90 TAB | Refills: 3 | Status: SHIPPED | OUTPATIENT
Start: 2019-07-01 | End: 2021-01-25 | Stop reason: ALTCHOICE

## 2019-07-01 NOTE — TELEPHONE ENCOUNTER
Refill of metoprolol XL 25 mg daily completed per VO of Dr. Agustín Eduardo.     Last OV: 1/2018  Next OV: PRN

## 2021-01-25 ENCOUNTER — OFFICE VISIT (OUTPATIENT)
Dept: CARDIOLOGY CLINIC | Age: 51
End: 2021-01-25
Payer: COMMERCIAL

## 2021-01-25 VITALS
HEIGHT: 67 IN | HEART RATE: 68 BPM | RESPIRATION RATE: 14 BRPM | DIASTOLIC BLOOD PRESSURE: 82 MMHG | BODY MASS INDEX: 27.78 KG/M2 | SYSTOLIC BLOOD PRESSURE: 120 MMHG | OXYGEN SATURATION: 99 % | WEIGHT: 177 LBS

## 2021-01-25 DIAGNOSIS — R03.0 PRE-HYPERTENSION: Primary | ICD-10-CM

## 2021-01-25 PROCEDURE — 99203 OFFICE O/P NEW LOW 30 MIN: CPT | Performed by: INTERNAL MEDICINE

## 2021-01-25 PROCEDURE — 93000 ELECTROCARDIOGRAM COMPLETE: CPT | Performed by: INTERNAL MEDICINE

## 2021-01-25 RX ORDER — GLUCOSAMINE SULFATE 1500 MG
1000 POWDER IN PACKET (EA) ORAL DAILY
COMMUNITY

## 2021-01-25 NOTE — PROGRESS NOTES
Po Milligan is a 52 y.o. female    Chief Complaint   Patient presents with    New Patient    Hypertension     Chest pain : NO  SOB : NO  Dizziness : NO  Edema : NO  Refills : NO    Visit Vitals  /82 (BP 1 Location: Left arm, BP Patient Position: Sitting)   Pulse 68   Resp 14   Ht 5' 7\" (1.702 m)   Wt 177 lb (80.3 kg)   LMP 01/22/2021   SpO2 99%   BMI 27.72 kg/m²       1. Have you been to the ER, urgent care clinic since your last visit? Hospitalized since your last visit? YES, for HTN [facility] 2 wks ago. 2. Have you seen or consulted any other health care providers outside of the [address] [place] [address] since your last visit? Include any pap smears or colon screening.  No

## 2021-01-25 NOTE — PROGRESS NOTES
Reason for new visit: HTN      HPI: Taye German is a 52 y.o. female with past medical history significant for hypertension is here for evaluation of symptoms of symptomatic blood pressure last week when she had an episode of nausea. She was on a virtual visit with a physician and at that time her blood pressure was significantly elevated in 180 range. She was asked to follow-up with [facility] where she got her blood pressure still elevated but EKG was normal.  She was asked to follow-up with me in the office. In the interim her blood pressure started trending down. She takes metoprolol at home which she has been continuing. Nausea has resolved. No other symptoms including chest pain, shortness of breath, lightheadedness or dizziness. EKG in my office today demonstrated normal sinus rhythm, normal axis, normal intervals, normal ST segment. Plan:    1. Hypertension: Blood pressure is controlled. Continue metoprolol XL 25 mg p.o. daily. Continue to monitor blood pressure at home. Low-salt diet. Increase aerobic activity. 2. See Dr. Sherrill Stallworth in 1 year. ATTENTION:   This medical record was transcribed using an electronic medical records/speech recognition system. Although proofread, it may and can contain electronic, spelling and other errors. Corrections may be executed at a later time. Please feel free to contact us for any clarifications as needed. History reviewed. No pertinent past medical history. History reviewed. No pertinent surgical history.           Family History   Problem Relation Age of Onset    Hypertension Mother     Cancer Mother     Diabetes Mother     Breast Cancer Mother 48    High Cholesterol Father     Breast Cancer Maternal Grandmother         76s           Social History     Socioeconomic History    Marital status: SINGLE     Spouse name: Not on file    Number of children: Not on file    Years of education: Not on file    Highest education level: Not on file   Occupational History    Not on file   Social Needs    Financial resource strain: Not on file    Food insecurity     Worry: Not on file     Inability: Not on file    Transportation needs     Medical: Not on file     Non-medical: Not on file   Tobacco Use    Smoking status: Never Smoker    Smokeless tobacco: Never Used   Substance and Sexual Activity    Alcohol use: Yes     Comment: occ    Drug use: No    Sexual activity: Not on file   Lifestyle    Physical activity     Days per week: Not on file     Minutes per session: Not on file    Stress: Not on file   Relationships    Social connections     Talks on phone: Not on file     Gets together: Not on file     Attends Baptism service: Not on file     Active member of club or organization: Not on file     Attends meetings of clubs or organizations: Not on file     Relationship status: Not on file    Intimate partner violence     Fear of current or ex partner: Not on file     Emotionally abused: Not on file     Physically abused: Not on file     Forced sexual activity: Not on file   Other Topics Concern    Not on file   Social History Narrative    Not on file         No Known Allergies         Current Outpatient Medications   Medication Sig Dispense Refill    cholecalciferol (Vitamin D3) 25 mcg (1,000 unit) cap Take 1,000 Units by mouth daily.  ascorbic acid (VITAMIN C PO) Take  by mouth.  elderberry fruit (ELDERBERRY PO) Take  by mouth.  ZINC PO Take  by mouth.  metoprolol succinate (TOPROL-XL) 25 mg XL tablet TAKE 1 TABLET BY MOUTH EVERY DAY 90 Tab 3    metoprolol succinate (TOPROL-XL) 25 mg XL tablet TAKE 1 TABLET BY MOUTH EVERY DAY 30 Tab 3    ibuprofen (MOTRIN) 200 mg tablet Take 400 mg by mouth every six (6) hours as needed for Pain.      B.infantis-B.ani-B.long-B.bifi (PROBIOTIC 4X) 10-15 mg TbEC Take  by mouth every other day.       ondansetron (ZOFRAN ODT) 8 mg disintegrating tablet Take 1 Tab by mouth every eight (8) hours as needed for Nausea. (Patient not taking: Reported on 1/25/2021) 12 Tab 0    DESOGESTREL-ETHINYL ESTRADIOL (APRI PO) Take  by mouth. ROS:  12 point review of systems was performed. All negative except for HPI     Physical Exam:  Visit Vitals  /82 (BP 1 Location: Left arm, BP Patient Position: Sitting)   Pulse 68   Resp 14   Ht 5' 7\" (1.702 m)   Wt 177 lb (80.3 kg)   LMP 01/22/2021   SpO2 99%   BMI 27.72 kg/m²       Gen:  Well-developed, well-nourished, in no acute distress  HEENT:  Pink conjunctivae, PERRL, hearing intact to voice, moist mucous membranes  Neck:  Supple, without masses, thyroid non-tender  Resp:  No accessory muscle use, clear breath sounds without wheezes rales or rhonchi  Card:  No murmurs, normal S1, S2 without thrills, bruits or peripheral edema  Abd:  Soft, non-tender, non-distended, normoactive bowel sounds are present, no palpable organomegaly and no detectable hernias  Lymph:  No cervical or inguinal adenopathy  Musc:  No cyanosis or clubbing  Skin:  No rashes or ulcers, skin turgor is good  Neuro:  Cranial nerves are grossly intact, no focal motor weakness, follows commands appropriately  Psych:  Good insight, oriented to person, place and time, alert     Labs:     Lab Results   Component Value Date/Time    WBC 12.7 (H) 05/16/2019 09:30 PM    HGB 12.6 05/16/2019 09:30 PM    HCT 39.1 05/16/2019 09:30 PM    PLATELET 700 64/34/7681 09:30 PM    MCV 88.5 05/16/2019 09:30 PM     Lab Results   Component Value Date/Time    Glucose 153 (H) 05/16/2019 09:30 PM    Creatinine 1.05 (H) 05/16/2019 09:30 PM      No results found for: CHOL, CHOLPOCT, HDL, LDL, LDLC, LDLCPOC, LDLCEXT, TRIGL, TGLPOCT, CHHD, CHHDX  Lab Results   Component Value Date/Time    ALT (SGPT) 24 12/04/2017 04:14 AM    Alk.  phosphatase 64 12/04/2017 04:14 AM    Bilirubin, total 0.3 12/04/2017 04:14 AM    Albumin 3.3 (L) 12/04/2017 04:14 AM    Protein, total 7.6 12/04/2017 04:14 AM PLATELET 305 18/91/8169 09:30 PM     No results found for: INR, INREXT, PTMR, PTP, PT1, PT2, INREXT   Lab Results   Component Value Date/Time    GFR est non-AA 56 (L) 05/16/2019 09:30 PM    GFR est AA >60 05/16/2019 09:30 PM    Creatinine 1.05 (H) 05/16/2019 09:30 PM    BUN 13 05/16/2019 09:30 PM    Sodium 136 05/16/2019 09:30 PM    Potassium 3.5 05/16/2019 09:30 PM    Chloride 107 05/16/2019 09:30 PM    CO2 22 05/16/2019 09:30 PM     No results found for: PSA, Radha [place], PSAR3, [id number], [id number]  No results found for: TSH, TSH2, TSH3, TSHP, TSHELE, TSHEXT, TT3, T3U, T3UP, FRT3, FT3, FT4, FT4P, T4, T4P, FT4T, TT7, TSHEXT   Lab Results   Component Value Date/Time    Glucose 153 (H) 05/16/2019 09:30 PM      No results found for: CPK, RCK1, RCK2, RCK3, RCK4, CKMB, CKNDX, CKND1, TROPT, TROIQ, BNPP, BNP   No results found for: BNP, BNPP, BNPPPOC, XBNPT, BNPNT   Lab Results   Component Value Date/Time    Sodium 136 05/16/2019 09:30 PM    Potassium 3.5 05/16/2019 09:30 PM    Chloride 107 05/16/2019 09:30 PM    CO2 22 05/16/2019 09:30 PM    Anion gap 7 05/16/2019 09:30 PM    Glucose 153 (H) 05/16/2019 09:30 PM    BUN 13 05/16/2019 09:30 PM    Creatinine 1.05 (H) 05/16/2019 09:30 PM    BUN/Creatinine ratio 12 05/16/2019 09:30 PM    GFR est AA >60 05/16/2019 09:30 PM    GFR est non-AA 56 (L) 05/16/2019 09:30 PM    Calcium 8.6 05/16/2019 09:30 PM      Lab Results   Component Value Date/Time    Sodium 136 05/16/2019 09:30 PM    Potassium 3.5 05/16/2019 09:30 PM    Chloride 107 05/16/2019 09:30 PM    CO2 22 05/16/2019 09:30 PM    Anion gap 7 05/16/2019 09:30 PM    Glucose 153 (H) 05/16/2019 09:30 PM    BUN 13 05/16/2019 09:30 PM    Creatinine 1.05 (H) 05/16/2019 09:30 PM    BUN/Creatinine ratio 12 05/16/2019 09:30 PM    GFR est AA >60 05/16/2019 09:30 PM    GFR est non-AA 56 (L) 05/16/2019 09:30 PM    Calcium 8.6 05/16/2019 09:30 PM    Bilirubin, total 0.3 12/04/2017 04:14 AM    ALT (SGPT) 24 12/04/2017 04:14 AM    Alk. phosphatase 64 12/04/2017 04:14 AM    Protein, total 7.6 12/04/2017 04:14 AM    Albumin 3.3 (L) 12/04/2017 04:14 AM    Globulin 4.3 (H) 12/04/2017 04:14 AM    A-G Ratio 0.8 (L) 12/04/2017 04:14 AM      No results found for: HBA1C, [id number], HGBE8, IXT7HRSJ, MER8OUGB      No results for input(s): CPK, CKMB, TROIQ in the last 72 hours. No lab exists for component: CKQMB, CPKMB        Problem List:     Problem List  Date Reviewed: 1/24/2018          Codes Class Noted    Pre-hypertension ICD-10-CM: R03.0  ICD-9-CM: 796.2  5/29/2015                Yo Moe MD, [facility]

## 2021-02-01 RX ORDER — METOPROLOL SUCCINATE 25 MG/1
TABLET, EXTENDED RELEASE ORAL
Qty: 90 TAB | Refills: 3 | Status: SHIPPED | OUTPATIENT
Start: 2021-02-01 | End: 2022-01-28 | Stop reason: SDUPTHER

## 2021-02-01 NOTE — TELEPHONE ENCOUNTER
Per VO of Dr. Gerhardt Exon: 1/25/2021    Future Appointments   Date Time Provider Uma Charlton   1/28/2022  2:20 PM Kendra Abarca MD [facility]       Requested Prescriptions     Pending Prescriptions Disp Refills    metoprolol succinate (TOPROL-XL) 25 mg XL tablet [Pharmacy Med Name: METOPROLOL SUCC ER 25 MG TAB] 90 Tab 3     Sig: TAKE 1 TABLET BY MOUTH EVERY DAY

## 2022-01-28 ENCOUNTER — OFFICE VISIT (OUTPATIENT)
Dept: CARDIOLOGY CLINIC | Age: 52
End: 2022-01-28
Payer: COMMERCIAL

## 2022-01-28 VITALS
HEART RATE: 65 BPM | BODY MASS INDEX: 29.95 KG/M2 | SYSTOLIC BLOOD PRESSURE: 126 MMHG | OXYGEN SATURATION: 99 % | DIASTOLIC BLOOD PRESSURE: 80 MMHG | HEIGHT: 67 IN | RESPIRATION RATE: 18 BRPM | WEIGHT: 190.8 LBS

## 2022-01-28 DIAGNOSIS — R03.0 PRE-HYPERTENSION: Primary | ICD-10-CM

## 2022-01-28 PROCEDURE — 99212 OFFICE O/P EST SF 10 MIN: CPT | Performed by: INTERNAL MEDICINE

## 2022-01-28 RX ORDER — METOPROLOL SUCCINATE 25 MG/1
25 TABLET, EXTENDED RELEASE ORAL DAILY
Qty: 90 TABLET | Refills: 3 | Status: SHIPPED | OUTPATIENT
Start: 2022-01-28

## 2022-01-28 RX ORDER — DESOGESTREL AND ETHINYL ESTRADIOL 0.15-0.03
KIT ORAL
COMMUNITY
Start: 2021-11-19

## 2022-01-28 NOTE — PROGRESS NOTES
Office Follow-up    NAME: Delores Muñoz   :  1970  MRM:  [id number]    Date:  2022            Assessment:     Problem List  Date Reviewed: 2018          Codes Class Noted    Pre-hypertension ICD-10-CM: R03.0  ICD-9-CM: 796.2  2015                 Plan:     1. Hypertension: Blood pressure is well controlled. Continue to monitor weight. Continue metoprolol. Continue to observe sodium in the diet as well as maintain regular activities and exercise. 2. See Dr. Mandy Santoyo in 1 year. 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 ATTENTION:   This medical record was transcribed using an electronic medical records/speech recognition system. Although proofread, it may and can contain electronic, spelling and other errors. Corrections may be executed at a later time. Please feel free to contact us for any clarifications as needed. Subjective:     Delores Muñoz, a 54y.o. year-old who presents for followup. She has past medical history significant for hypertension. She has no significant symptoms. In past 1 year she has not had any new hospital admissions. After she started going back to the office. Overall she feels fine.         Exam:     Physical Exam:  Visit Vitals  /80 (BP 1 Location: Left upper arm, BP Patient Position: Sitting, BP Cuff Size: Adult)   Pulse 65   Resp 18   Ht 5' 7\" (1.702 m)   Wt 190 lb 12.8 oz (86.5 kg)   SpO2 99%   BMI 29.88 kg/m²     General appearance - alert, well appearing, and in no distress  Mental status - affect appropriate to mood  Eyes - sclera anicteric, moist mucous membranes  Neck - supple, no significant adenopathy  Chest - clear to auscultation, no wheezes, rales or rhonchi  Heart - normal rate, regular rhythm, normal S1, S2, no murmurs, rubs, clicks or gallops  Abdomen - soft, nontender, nondistended, no masses or organomegaly  Extremities - peripheral pulses normal, no pedal edema  Skin - normal coloration  no manisha    Medications:     Current Outpatient Medications   Medication Sig    Apri 0.15-0.03 mg tab     metoprolol succinate (TOPROL-XL) 25 mg XL tablet TAKE 1 TABLET BY MOUTH EVERY DAY    cholecalciferol (Vitamin D3) 25 mcg (1,000 unit) cap Take 1,000 Units by mouth daily.  elderberry fruit (ELDERBERRY PO) Take  by mouth.  B.infantis-B.ani-B.long-B.bifi (PROBIOTIC 4X) 10-15 mg TbEC Take  by mouth every other day.  ascorbic acid (VITAMIN C PO) Take  by mouth. (Patient not taking: Reported on 1/28/2022)    ZINC PO Take  by mouth. (Patient not taking: Reported on 1/28/2022)    ibuprofen (MOTRIN) 200 mg tablet Take 400 mg by mouth every six (6) hours as needed for Pain. (Patient not taking: Reported on 1/28/2022)    DESOGESTREL-ETHINYL ESTRADIOL (APRI PO) Take  by mouth. No current facility-administered medications for this visit. Diagnostic Data Review:       No specialty comments available. Lab Review:   No results found for: CHOL, CHOLX, CHLST, CHOLV, 693875, HDL, HDLP, LDL, LDLC, DLDLP, TGLX, TRIGL, TRIGP, CHHD, CHHDX  Lab Results   Component Value Date/Time    Creatinine 1.05 (H) 05/16/2019 09:30 PM     Lab Results   Component Value Date/Time    BUN 13 05/16/2019 09:30 PM     Lab Results   Component Value Date/Time    Potassium 3.5 05/16/2019 09:30 PM     No results found for: HBA1C, OPS3VSCB  Lab Results   Component Value Date/Time    HGB 12.6 05/16/2019 09:30 PM     Lab Results   Component Value Date/Time    PLATELET 619 93/68/8830 09:30 PM     No results for input(s): CPK, CKMB, TROIQ in the last 72 hours. No lab exists for component: CKQMB, CPKMB             ___________________________________________________    Levern Millington.  Francis Laurent MD, [facility] [place]

## 2022-01-28 NOTE — PROGRESS NOTES
Manuela Rao is a 54 y.o. female    Chief Complaint   Patient presents with    Hypertension     Pre       Chest pain : no  SOB : no  Dizziness : no  Edema : no  Refills : no    Visit Vitals  /80 (BP 1 Location: Left upper arm, BP Patient Position: Sitting, BP Cuff Size: Adult)   Pulse 65   Resp 18   Ht 5' 7\" (1.702 m)   Wt 190 lb 12.8 oz (86.5 kg)   SpO2 99%   BMI 29.88 kg/m²       1. Have you been to the ER, urgent care clinic since your last visit? Hospitalized since your last visit? No    2. Have you seen or consulted any other health care providers outside of the [address] [place] [address] since your last visit? Include any pap smears or colon screening.  No

## 2022-01-28 NOTE — PROGRESS NOTES
All orders entered per verbal orders of Dr. Carlos Chase Metoprolol     See Dr. Gianluca Latham in 1 year. Refill per VO of Dr. Malu Gross:    Last appt: 1/25/2021    Future Appointments   Date Time Provider Uma Latesha   1/30/2023  2:00 PM Vitaly Abarca MD [facility]       Requested Prescriptions     Pending Prescriptions Disp Refills    metoprolol succinate (TOPROL-XL) 25 mg XL tablet 90 Tablet 3     Sig: Take 1 Tablet by mouth daily.

## 2022-07-01 NOTE — ED PROVIDER NOTES
HPI Comments: This is a 62-year-old female who comes to the emergency room with a chief complaint of fatigue, lightheadedness, and diarrhea. Patient states that she recently returned from a trip to [facility]. While there, the patient states that the before she was supposed to leave she developed diarrhea. Patient states she was seen by the [facility] doctor and prescribed an antibiotic. Upon looking at the label this is a combination of Cipro 500 mg and Flagyl 500 mg. Patient states that she has been lightheaded and an over the past day and a half. Patient also states that she has felt tired. Patient denies any abdominal pain at the present time. The patient denies any nausea or vomiting. Patient is a 41 y.o. female presenting with dizziness, fatigue, and diarrhea. The history is provided by the patient. No  was used. Dizziness   This is a new problem. The problem has not changed since onset. There was no focality noted. Pertinent negatives include no loss of balance, no memory loss and no mental status change. There has been no fever. Associated symptoms include nausea. Pertinent negatives include no shortness of breath, no chest pain, no vomiting, no altered mental status and no confusion. Associated medical issues do not include trauma, seizures or dementia. Fatigue   This is a new problem. The current episode started 2 days ago. The problem has not changed since onset. There was no focality noted. Pertinent negatives include no loss of balance, no memory loss and no mental status change. There has been no fever. Associated symptoms include nausea. Pertinent negatives include no shortness of breath, no chest pain, no vomiting, no altered mental status and no confusion. Associated medical issues do not include trauma, seizures or dementia. Diarrhea    This is a new problem. The current episode started 2 days ago. The problem occurs daily. The problem has not changed since onset. The patient is experiencing no pain. Associated symptoms include diarrhea and nausea. Pertinent negatives include no fever, no vomiting, no dysuria, no hematuria, no myalgias, no trauma, no chest pain and no back pain. Nothing worsens the pain. The pain is relieved by nothing. Her past medical history does not include PUD, GERD, UTI or DM. The patient's surgical history non-contributory. No past medical history on file. No past surgical history on file. Family History:   Problem Relation Age of Onset    Hypertension Mother     Cancer Mother     Diabetes Mother     Breast Cancer Mother 48    High Cholesterol Father     Breast Cancer Maternal Grandmother      76s       Social History     Social History    Marital status: SINGLE     Spouse name: N/A    Number of children: N/A    Years of education: N/A     Occupational History    Not on file. Social History Main Topics    Smoking status: Never Smoker    Smokeless tobacco: Never Used    Alcohol use Yes      Comment: occ    Drug use: No    Sexual activity: Not on file     Other Topics Concern    Not on file     Social History Narrative     ALLERGIES: Review of patient's allergies indicates no known allergies. Review of Systems   Constitutional: Positive for fatigue. Negative for appetite change, chills, fever and unexpected weight change. HENT: Negative for ear pain, hearing loss, rhinorrhea and trouble swallowing. Eyes: Negative for pain and visual disturbance. Respiratory: Negative for cough, chest tightness and shortness of breath. Cardiovascular: Negative for chest pain and palpitations. Gastrointestinal: Positive for diarrhea and nausea. Negative for abdominal distention, abdominal pain, blood in stool and vomiting. Genitourinary: Negative for dysuria, hematuria and urgency. Musculoskeletal: Negative for back pain and myalgias. Skin: Negative for rash. Neurological: Positive for dizziness.  Negative for syncope, weakness, numbness and loss of balance. Psychiatric/Behavioral: Negative for confusion, memory loss and suicidal ideas. All other systems reviewed and are negative. Vitals:    12/04/17 0500 12/04/17 0515 12/04/17 0530 12/04/17 0545   BP: (!) 159/94 (!) 169/97 (!) 155/104 (!) 157/102   Pulse:       Resp:       Temp:       SpO2:       Weight:       Height:                Physical Exam   Constitutional: She is oriented to person, place, and time. She appears well-developed and well-nourished. No distress. HENT:   Head: Normocephalic and atraumatic. Right Ear: External ear normal.   Left Ear: External ear normal.   Nose: Nose normal.   Mouth/Throat: Uvula is midline. Mucous membranes are dry. No uvula swelling. No oropharyngeal exudate. Eyes: Conjunctivae and EOM are normal. Pupils are equal, round, and reactive to light. Right eye exhibits no discharge. Left eye exhibits no discharge. No scleral icterus. Neck: Normal range of motion. Neck supple. No JVD present. No tracheal deviation present. Cardiovascular: Normal rate, regular rhythm, normal heart sounds and intact distal pulses. Exam reveals no gallop and no friction rub. No murmur heard. Pulmonary/Chest: Effort normal and breath sounds normal. No stridor. No respiratory distress. She has no decreased breath sounds. She has no wheezes. She has no rhonchi. She has no rales. She exhibits no tenderness. Abdominal: Soft. She exhibits no distension. Bowel sounds are increased. There is no tenderness. There is no rebound and no guarding. Musculoskeletal: Normal range of motion. She exhibits no edema or tenderness. Neurological: She is alert and oriented to person, place, and time. She has normal strength and normal reflexes. No cranial nerve deficit or sensory deficit. She exhibits normal muscle tone. Coordination normal. GCS eye subscore is 4. GCS verbal subscore is 5. GCS motor subscore is 6. Skin: Skin is warm and dry. No rash noted.  She is not diaphoretic. No erythema. No pallor. Psychiatric: She has a normal mood and affect. Her behavior is normal. Judgment and thought content normal.   Nursing note and vitals reviewed. MDM  Number of Diagnoses or Management Options  Dehydration:   Gastroenteritis, acute:      Amount and/or Complexity of Data Reviewed  Clinical lab tests: ordered and reviewed    Risk of Complications, Morbidity, and/or Mortality  Presenting problems: moderate  Diagnostic procedures: low  Management options: moderate    Patient Progress  Patient progress: improved    ED Course       Procedures   Chief Complaint   Patient presents with    Dizziness    Fatigue    Diarrhea       The patients presenting problems have been discussed, and they are in agreement with the care plan formulated and outlined with them. I have encouraged them to ask questions as they arise throughout their visit. MEDICATIONS GIVEN:  Medications   sodium chloride (NS) flush 5-10 mL (not administered)   sodium chloride (NS) flush 5-10 mL (not administered)   dicyclomine (BENTYL) capsule 20 mg (20 mg Oral Refused 12/4/17 2025)   sodium chloride 0.9 % bolus infusion 1,000 mL (0 mL IntraVENous IV Completed 12/4/17 0731)   ondansetron (ZOFRAN) injection 4 mg (4 mg IntraVENous Given 12/4/17 7295)       LABS REVIEWED:  Recent Results (from the past 24 hour(s))   METABOLIC PANEL, COMPREHENSIVE    Collection Time: 12/04/17  4:14 AM   Result Value Ref Range    Sodium 140 136 - 145 mmol/L    Potassium 3.4 (L) 3.5 - 5.1 mmol/L    Chloride 105 97 - 108 mmol/L    CO2 26 21 - 32 mmol/L    Anion gap 9 5 - 15 mmol/L    Glucose 115 (H) 65 - 100 mg/dL    BUN 3 (L) 6 - 20 MG/DL    Creatinine 0.87 0.55 - 1.02 MG/DL    BUN/Creatinine ratio 3 (L) 12 - 20      GFR est AA >60 >60 ml/min/1.73m2    GFR est non-AA >60 >60 ml/min/1.73m2    Calcium 9.0 8.5 - 10.1 MG/DL    Bilirubin, total 0.3 0.2 - 1.0 MG/DL    ALT (SGPT) 24 12 - 78 U/L    AST (SGOT) 23 15 - 37 U/L    Alk. phosphatase 64 45 - 117 U/L    Protein, total 7.6 6.4 - 8.2 g/dL    Albumin 3.3 (L) 3.5 - 5.0 g/dL    Globulin 4.3 (H) 2.0 - 4.0 g/dL    A-G Ratio 0.8 (L) 1.1 - 2.2     CBC WITH AUTOMATED DIFF    Collection Time: 12/04/17  4:14 AM   Result Value Ref Range    WBC 8.5 3.6 - 11.0 K/uL    RBC 4.75 3.80 - 5.20 M/uL    HGB 13.7 11.5 - 16.0 g/dL    HCT 40.4 35.0 - 47.0 %    MCV 85.1 80.0 - 99.0 FL    MCH 28.8 26.0 - 34.0 PG    MCHC 33.9 30.0 - 36.5 g/dL    RDW 12.9 11.5 - 14.5 %    PLATELET 185 361 - 055 K/uL    NEUTROPHILS 62 32 - 75 %    LYMPHOCYTES 31 12 - 49 %    MONOCYTES 6 5 - 13 %    EOSINOPHILS 0 0 - 7 %    BASOPHILS 1 0 - 1 %    ABS. NEUTROPHILS 5.3 1.8 - 8.0 K/UL    ABS. LYMPHOCYTES 2.6 0.8 - 3.5 K/UL    ABS. MONOCYTES 0.5 0.0 - 1.0 K/UL    ABS. EOSINOPHILS 0.0 0.0 - 0.4 K/UL    ABS.  BASOPHILS 0.1 0.0 - 0.1 K/UL    DF SMEAR SCANNED      RBC COMMENTS NORMOCYTIC, NORMOCHROMIC      WBC COMMENTS ATYPICAL LYMPHOCYTES PRESENT     LIPASE    Collection Time: 12/04/17  4:14 AM   Result Value Ref Range    Lipase 90 73 - 393 U/L   URINALYSIS W/MICROSCOPIC    Collection Time: 12/04/17  4:15 AM   Result Value Ref Range    Color YELLOW/STRAW      Appearance CLEAR CLEAR      Specific gravity <1.005 1.003 - 1.030    pH (UA) 7.0 5.0 - 8.0      Protein NEGATIVE  NEG mg/dL    Glucose NEGATIVE  NEG mg/dL    Ketone NEGATIVE  NEG mg/dL    Bilirubin NEGATIVE  NEG      Blood MODERATE (A) NEG      Urobilinogen 0.2 0.2 - 1.0 EU/dL    Nitrites NEGATIVE  NEG      Leukocyte Esterase NEGATIVE  NEG      WBC 0-4 0 - 4 /hpf    RBC 5-10 0 - 5 /hpf    Epithelial cells FEW FEW /lpf    Bacteria NEGATIVE  NEG /hpf    Hyaline cast 0-2 0 - 5 /lpf   URINE CULTURE HOLD SAMPLE    Collection Time: 12/04/17  4:15 AM   Result Value Ref Range    Urine culture hold URINE ON HOLD IN MICROBIOLOGY DEPT FOR 3 DAYS         VITAL SIGNS:  Patient Vitals for the past 12 hrs:   Temp Pulse Resp BP SpO2   12/04/17 0545 - - - (!) 157/102 -   12/04/17 0530 - - - (!) 155/104 -   12/04/17 0515 - - - (!) 169/97 -   12/04/17 0500 - - - (!) 159/94 -   12/04/17 0445 - - - (!) 162/91 -   12/04/17 0355 98.7 °F (37.1 °C) 75 18 (!) 199/101 97 %       RADIOLOGY RESULTS:  The following have been ordered and reviewed:  No orders to display     PROGRESS NOTES:  Pt feeling better. Pt instructed to finish combo of cipro/flagyl. Discussed results and plan with patient. Patient will be discharged home with PCP followup. Patient instructed to return to the emergency room for any worsening symptoms or any other concerns. DIAGNOSIS:    1. Gastroenteritis, acute    2. Dehydration        PLAN:  Follow-up Information     Follow up With Details Comments Contact Info    Provider Unknown In 1 week As needed Patient not available to ask      [facility] EMERGENCY DEPT  If symptoms worsen [address] [facility]  [phone]        Discharge Medication List as of 12/4/2017  5:47 AM      START taking these medications    Details   ondansetron (ZOFRAN ODT) 4 mg disintegrating tablet Take 1 Tab by mouth every eight (8) hours as needed for Nausea. , Print, Disp-10 Tab, R-0      dicyclomine (BENTYL) 20 mg tablet Take 1 Tab by mouth every six (6) hours for 20 doses. , Print, Disp-20 Tab, R-0         CONTINUE these medications which have NOT CHANGED    Details   metoprolol succinate (TOPROL-XL) 25 mg XL tablet Take 1 Tab by mouth daily. , Normal, Disp-30 Tab, R-3      DESOGESTREL-ETHINYL ESTRADIOL (APRI PO) Take  by mouth., Historical Med      B.infantis-B.ani-B.long-B.bifi (PROBIOTIC 4X) 10-15 mg TbEC Take  by mouth., Historical Med      ibuprofen (MOTRIN) 200 mg tablet Take 400 mg by mouth every six (6) hours as needed for Pain., Historical Med               ED COURSE: The patients hospital course has been uncomplicated. Detail Level: Zone Hide Include Location In Plan Question?: No

## 2023-02-27 ENCOUNTER — OFFICE VISIT (OUTPATIENT)
Dept: CARDIOLOGY CLINIC | Age: 53
End: 2023-02-27
Payer: COMMERCIAL

## 2023-02-27 VITALS
DIASTOLIC BLOOD PRESSURE: 78 MMHG | SYSTOLIC BLOOD PRESSURE: 128 MMHG | OXYGEN SATURATION: 98 % | HEART RATE: 80 BPM | WEIGHT: 190 LBS | HEIGHT: 67 IN | BODY MASS INDEX: 29.82 KG/M2

## 2023-02-27 DIAGNOSIS — I10 PRIMARY HYPERTENSION: Primary | ICD-10-CM

## 2023-02-27 PROCEDURE — 99214 OFFICE O/P EST MOD 30 MIN: CPT | Performed by: INTERNAL MEDICINE

## 2023-02-27 PROCEDURE — 3074F SYST BP LT 130 MM HG: CPT | Performed by: INTERNAL MEDICINE

## 2023-02-27 PROCEDURE — 3078F DIAST BP <80 MM HG: CPT | Performed by: INTERNAL MEDICINE

## 2023-02-27 RX ORDER — HYDROXYZINE 25 MG/1
TABLET, FILM COATED ORAL
COMMUNITY

## 2023-02-27 NOTE — PROGRESS NOTES
Office Follow-up    NAME: Lacey Almonte   :  1970  MRM:  [id number]    Date:  2023            Assessment:     Problem List  Date Reviewed: 2018            Codes Class Noted    Pre-hypertension ICD-10-CM: R03.0  ICD-9-CM: 796.2  2015              Plan:     Hypertension: Blood pressure is well controlled. Continue to monitor weight. Continue metoprolol. Continue to observe sodium in the diet as well as maintain regular activities and exercise. Had labs via work. Has not seen PCP. She will get the labs with new PCP. Preventive Cardiac Workup- Check CAC. See Dr. Josey Bolton in 1 year. 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 Although proofread, it may and can contain electronic, spelling and other errors. Corrections may be executed at a later time. Please feel free to contact us for any clarifications as needed. Subjective:     Lacey Almonte, a 50y.o. year-old who presents for followup. She has past medical history significant for hypertension. She has no significant symptoms. In past 1 year she has not had any new hospital admissions. After she started going back to the office. Overall she feels fine.         Exam:     Physical Exam:  Visit Vitals  /78 (BP 1 Location: Left upper arm, BP Patient Position: Sitting)   Pulse 80   Ht 5' 7\" (1.702 m)   Wt 190 lb (86.2 kg)   SpO2 98%   BMI 29.76 kg/m²     General appearance - alert, well appearing, and in no distress  Mental status - affect appropriate to mood  Eyes - sclera anicteric, moist mucous membranes  Neck - supple, no significant adenopathy  Chest - clear to auscultation, no wheezes, rales or rhonchi  Heart - normal rate, regular rhythm, normal S1, S2, no murmurs, rubs, clicks or gallops  Abdomen - soft, nontender, nondistended, no masses or organomegaly  Extremities - peripheral pulses normal, no pedal edema  Skin - normal coloration  no rashes    Medications:     Current Outpatient Medications   Medication Sig    hydrOXYzine HCL (ATARAX) 25 mg tablet Take  by mouth three (3) times daily as needed. Apri 0.15-0.03 mg tab     metoprolol succinate (TOPROL-XL) 25 mg XL tablet Take 1 Tablet by mouth daily. DESOGESTREL-ETHINYL ESTRADIOL (APRI PO) Take  by mouth. cholecalciferol (VITAMIN D3) 25 mcg (1,000 unit) cap Take 1,000 Units by mouth daily. (Patient not taking: Reported on 2/27/2023)    ascorbic acid (VITAMIN C PO) Take  by mouth. (Patient not taking: Reported on 1/28/2022)    elderberry fruit (ELDERBERRY PO) Take  by mouth. (Patient not taking: Reported on 2/27/2023)    ZINC PO Take  by mouth. (Patient not taking: No sig reported)    ibuprofen (MOTRIN) 200 mg tablet Take 400 mg by mouth every six (6) hours as needed for Pain. (Patient not taking: No sig reported)    B.animalis,bifid,infantis,long 10-15 mg TbEC Take  by mouth every other day. (Patient not taking: Reported on 2/27/2023)     No current facility-administered medications for this visit. Diagnostic Data Review:       No specialty comments available. Lab Review:   No results found for: CHOL, CHOLX, CHLST, CHOLV, 539635, HDL, HDLP, LDL, LDLC, DLDLP, TGLX, TRIGL, TRIGP, CHHD, CHHDX  Lab Results   Component Value Date/Time    Creatinine 1.05 (H) 05/16/2019 09:30 PM     Lab Results   Component Value Date/Time    BUN 13 05/16/2019 09:30 PM     Lab Results   Component Value Date/Time    Potassium 3.5 05/16/2019 09:30 PM     No results found for: HBA1C, UQE9SULY, FQV6FGUP  Lab Results   Component Value Date/Time    HGB 12.6 05/16/2019 09:30 PM     Lab Results   Component Value Date/Time    PLATELET 441 38/53/6251 09:30 PM     No results for input(s): CPK, CKMB, TROIQ in the last 72 hours. No lab exists for component: CKQMB, CPKMB             ___________________________________________________    Clay Sidhu Yuridia Harrell MD, [facility] [place]

## 2023-02-27 NOTE — PROGRESS NOTES
Chief Complaint   Patient presents with    Follow-up     Vitals:    02/27/23 1627   BP: 128/78   BP 1 Location: Left upper arm   BP Patient Position: Sitting   Pulse: 80   Height: 5' 7\" (1.702 m)   Weight: 190 lb (86.2 kg)   SpO2: 98%       Chest pain denied     SOB denied     Palpitations denied     Swelling in hands/feet denied     Dizziness denied     Recent hospital stays denied     Refills denied

## 2023-02-27 NOTE — PROGRESS NOTES
All orders entered per verbal orders of Dr. Abimael Abarca MD    CAC score- Family hx of CAD, HTN   See Dr. Abhilash Chappell in 1 year.

## 2023-03-11 ENCOUNTER — HOSPITAL ENCOUNTER (OUTPATIENT)
Dept: CT IMAGING | Age: 53
Discharge: HOME | End: 2023-03-11
Attending: INTERNAL MEDICINE
Payer: SELF-PAY

## 2023-03-11 DIAGNOSIS — I10 PRIMARY HYPERTENSION: ICD-10-CM

## 2023-03-11 PROCEDURE — 75571 CT HRT W/O DYE W/CA TEST: CPT

## 2023-03-13 ENCOUNTER — TELEPHONE (OUTPATIENT)
Dept: CARDIOLOGY CLINIC | Age: 53
End: 2023-03-13

## 2023-03-13 NOTE — TELEPHONE ENCOUNTER
[facility] radiology called to see was fax receive from at scan. .please advise    Arelis Hurley # [phone]

## 2023-03-13 NOTE — TELEPHONE ENCOUNTER
Spoke with  Zeenat Harmon at [facility] radiology department , identified the pt with name and . I informed her that I did not receive any faxed with the CAC report, but that we can see the results report and the imaging in CC. Zeenat Harmon expressed understanding and agreement. Pt has no further questions or concerns at this time.

## 2023-03-31 ENCOUNTER — PATIENT MESSAGE (OUTPATIENT)
Dept: CARDIOLOGY CLINIC | Age: 53
End: 2023-03-31

## 2023-03-31 DIAGNOSIS — R59.0 MEDIASTINAL ADENOPATHY: Primary | ICD-10-CM

## 2023-03-31 NOTE — TELEPHONE ENCOUNTER
All orders entered per verbal orders of Dr. Ana María Faith. MD Tevin  Chest CT shows mediatinal adenopathy. Please refer to Dr. Lu Robb for further evaluation.      Referral and MR faxed  to Dr. Jayro Harrell @[phone]

## 2023-04-17 ENCOUNTER — TRANSCRIBE ORDER (OUTPATIENT)
Dept: SCHEDULING | Age: 53
End: 2023-04-17

## 2023-04-17 DIAGNOSIS — R59.0 MEDIASTINAL LYMPHADENOPATHY: Primary | ICD-10-CM

## 2023-04-26 RX ORDER — METOPROLOL SUCCINATE 25 MG/1
TABLET, EXTENDED RELEASE ORAL
Qty: 90 TABLET | Refills: 3 | Status: SHIPPED | OUTPATIENT
Start: 2023-04-26

## 2023-04-26 NOTE — TELEPHONE ENCOUNTER
Refill per VO of Dr. Renée Cannon:    Last appt: 2/27/2023    Future Appointments   Date Time Provider Uma Latesha   3/1/2024  9:00 AM Chad Abarca MD [facility]       Requested Prescriptions     Pending Prescriptions Disp Refills    metoprolol succinate (TOPROL-XL) 25 mg XL tablet [Pharmacy Med Name: METOPROLOL SUCC ER 25 MG TAB] 90 Tablet 3     Sig: TAKE 1 TABLET BY MOUTH EVERY DAY

## 2023-05-07 ENCOUNTER — HOSPITAL ENCOUNTER (OUTPATIENT)
Facility: HOSPITAL | Age: 53
Discharge: HOME | End: 2023-05-10
Payer: COMMERCIAL

## 2023-05-07 DIAGNOSIS — R59.0 MEDIASTINAL LYMPHADENOPATHY: ICD-10-CM

## 2023-05-07 PROCEDURE — 6360000004 HC RX CONTRAST MEDICATION: Performed by: INTERNAL MEDICINE

## 2023-05-07 PROCEDURE — 71260 CT THORAX DX C+: CPT

## 2023-05-07 RX ADMIN — IOPAMIDOL 100 ML: 755 INJECTION, SOLUTION INTRAVENOUS at 09:59

## 2023-08-12 ENCOUNTER — HOSPITAL ENCOUNTER (OUTPATIENT)
Facility: HOSPITAL | Age: 53
End: 2023-08-12
Attending: INTERNAL MEDICINE
Payer: COMMERCIAL

## 2023-08-12 DIAGNOSIS — R59.0 MEDIASTINAL LYMPHADENOPATHY: ICD-10-CM

## 2023-08-12 PROCEDURE — 6360000004 HC RX CONTRAST MEDICATION: Performed by: INTERNAL MEDICINE

## 2023-08-12 PROCEDURE — 71260 CT THORAX DX C+: CPT

## 2023-08-12 RX ADMIN — IOPAMIDOL 100 ML: 755 INJECTION, SOLUTION INTRAVENOUS at 10:09

## 2024-03-01 ENCOUNTER — OFFICE VISIT (OUTPATIENT)
Age: 54
End: 2024-03-01
Payer: COMMERCIAL

## 2024-03-01 VITALS
SYSTOLIC BLOOD PRESSURE: 110 MMHG | WEIGHT: 199 LBS | DIASTOLIC BLOOD PRESSURE: 72 MMHG | OXYGEN SATURATION: 98 % | HEART RATE: 79 BPM | HEIGHT: 67 IN | BODY MASS INDEX: 31.23 KG/M2

## 2024-03-01 DIAGNOSIS — D86.0 PULMONARY SARCOIDOSIS (HCC): ICD-10-CM

## 2024-03-01 DIAGNOSIS — I10 ESSENTIAL (PRIMARY) HYPERTENSION: Primary | ICD-10-CM

## 2024-03-01 PROCEDURE — 3078F DIAST BP <80 MM HG: CPT | Performed by: INTERNAL MEDICINE

## 2024-03-01 PROCEDURE — 99213 OFFICE O/P EST LOW 20 MIN: CPT | Performed by: INTERNAL MEDICINE

## 2024-03-01 PROCEDURE — 3074F SYST BP LT 130 MM HG: CPT | Performed by: INTERNAL MEDICINE

## 2024-03-01 NOTE — PROGRESS NOTES
Office Follow-up    NAME: Selina Faith   :  1970  MRM:  [id number]       Plan:     Hypertension: Blood pressure is well controlled.  Continue to monitor weight.  Continue metoprolol.  Continue to observe sodium in the diet as well as maintain regular activities and exercise.  CAC 0 in 3/11/2023- Repeat in . FLP with PCP were normal.   Pulmonary sarcoidosis diagnosed in  as incidental finding on CAC score. No active disease. See's Dr. Lucas.   See Dr. Mckay in 1 year.     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                      ATTENTION:   This medical record was transcribed using an electronic medical records/speech recognition system.  Although proofread, it may and can contain electronic, spelling and other errors.  Corrections may be executed at a later time.  Please feel free to contact us for any clarifications as needed.         Subjective:     No CP, SOB, Palpitations.     -----------  Selina Faith, a 52 y.o. year-old who presents for followup.  She has past medical history significant for hypertension.  She has no significant symptoms.  In past 1 year she has not had any new hospital admissions.  After she started going back to the office.  Overall she feels fine.        Exam:     Physical Exam:  Visit Vitals  /72 (Site: Right Upper Arm, Position: Sitting)   Pulse 79   Ht 1.702 m (5' 7\")   Wt 90.3 kg (199 lb)   SpO2 98%   BMI 31.17 kg/m²       General appearance - alert, well appearing, and in no distress  Mental status - affect appropriate to mood  Eyes - sclera anicteric, moist mucous membranes  Neck - supple, no significant adenopathy      Medications:     Current Outpatient Medications   Medication Sig Dispense Refill    hydrOXYzine HCl (ATARAX) 25 MG tablet Take by mouth 3 times daily as needed      metoprolol succinate (TOPROL XL) 25 MG extended release tablet Take 1 tablet by mouth daily      ZINC PO Take by mouth      vitamin D 25 MCG (1000 UT) CAPS Take

## 2024-03-01 NOTE — PROGRESS NOTES
Selina Faith is a 53 y.o. female    had concerns including Hypertension.    Vitals:    03/01/24 0920   BP: 110/72   Site: Right Upper Arm   Position: Sitting   Pulse: 79   SpO2: 98%   Weight: 90.3 kg (199 lb)   Height: 1.702 m (5' 7\")        Chest pain NO

## 2024-04-23 RX ORDER — METOPROLOL SUCCINATE 25 MG/1
25 TABLET, EXTENDED RELEASE ORAL DAILY
Qty: 90 TABLET | Refills: 3 | Status: SHIPPED | OUTPATIENT
Start: 2024-04-23

## 2024-04-23 NOTE — TELEPHONE ENCOUNTER
Refill per VO of Dr. Wilber Mckay:    3/1/2024    Future Appointments   Date Time Provider Department Center   3/3/2025  4:20 PM Wilber Mckay MD [facility]       Requested Prescriptions     Pending Prescriptions Disp Refills    metoprolol succinate (TOPROL XL) 25 MG extended release tablet [Pharmacy Med Name: METOPROLOL SUCC ER 25 MG TAB] 90 tablet 3     Sig: TAKE 1 TABLET BY MOUTH EVERY DAY

## 2024-08-09 ENCOUNTER — HOSPITAL ENCOUNTER (OUTPATIENT)
Facility: HOSPITAL | Age: 54
Discharge: HOME | End: 2024-08-09
Attending: INTERNAL MEDICINE
Payer: COMMERCIAL

## 2024-08-09 DIAGNOSIS — D86.9 SARCOIDOSIS: ICD-10-CM

## 2024-08-09 PROCEDURE — 71250 CT THORAX DX C-: CPT

## 2024-11-20 ENCOUNTER — TELEPHONE (OUTPATIENT)
Age: 54
End: 2024-11-20

## 2024-11-20 NOTE — TELEPHONE ENCOUNTER
Patient is calling because her pharmacy said she didn't have any more refills for her Metoprolol Succinate  mg.Pharmacy is confirm.    [phone] patient

## 2025-03-03 ENCOUNTER — OFFICE VISIT (OUTPATIENT)
Age: 55
End: 2025-03-03
Payer: COMMERCIAL

## 2025-03-03 VITALS
HEART RATE: 59 BPM | WEIGHT: 199 LBS | DIASTOLIC BLOOD PRESSURE: 70 MMHG | HEIGHT: 67 IN | BODY MASS INDEX: 31.23 KG/M2 | SYSTOLIC BLOOD PRESSURE: 110 MMHG | OXYGEN SATURATION: 99 %

## 2025-03-03 DIAGNOSIS — D86.0 PULMONARY SARCOIDOSIS: ICD-10-CM

## 2025-03-03 DIAGNOSIS — I10 ESSENTIAL (PRIMARY) HYPERTENSION: Primary | ICD-10-CM

## 2025-03-03 PROCEDURE — 3078F DIAST BP <80 MM HG: CPT | Performed by: INTERNAL MEDICINE

## 2025-03-03 PROCEDURE — 99213 OFFICE O/P EST LOW 20 MIN: CPT | Performed by: INTERNAL MEDICINE

## 2025-03-03 PROCEDURE — 3074F SYST BP LT 130 MM HG: CPT | Performed by: INTERNAL MEDICINE

## 2025-03-03 NOTE — PROGRESS NOTES
No chief complaint on file.    Vitals:    03/03/25 1629   BP: 110/70   Site: Left Upper Arm   Position: Sitting   Cuff Size: Medium Adult   Pulse: 59   SpO2: 99%   Weight: 90.3 kg (199 lb)   Height: 1.702 m (5' 7\")      /70 (Site: Left Upper Arm, Position: Sitting, Cuff Size: Medium Adult)   Pulse 59   Ht 1.702 m (5' 7\")   Wt 90.3 kg (199 lb)   SpO2 99%   BMI 31.17 kg/m²

## 2025-03-03 NOTE — PROGRESS NOTES
Office Follow-up    NAME: Selina Faith   :  1970  MRM:  [id number]    Date: 25         Plan:     Hypertension: Blood pressure is well controlled.  Continue to monitor weight.  Continue metoprolol.  Continue to observe sodium in the diet as well as maintain regular activities and exercise.  CAC 0 in 3/11/2023- Repeat in . FLP with PCP were normal.   Pulmonary sarcoidosis diagnosed in  as incidental finding on CAC score. No active disease. See's Dr. Lucas.   See Dr. Mckay in 1 year.     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                      ATTENTION:   This medical record was transcribed using an electronic medical records/speech recognition system.  Although proofread, it may and can contain electronic, spelling and other errors.  Corrections may be executed at a later time.  Please feel free to contact us for any clarifications as needed.         Subjective:     No CP, SOB, Palpitations.     -----------  Selina Faith, a 52 y.o. year-old who presents for followup.  She has past medical history significant for hypertension.  She has no significant symptoms.  In past 1 year she has not had any new hospital admissions.  After she started going back to the office.  Overall she feels fine.        Exam:     Physical Exam:  Visit Vitals  /70 (Site: Left Upper Arm, Position: Sitting, Cuff Size: Medium Adult)   Pulse 59   Ht 1.702 m (5' 7\")   Wt 90.3 kg (199 lb)   SpO2 99%   BMI 31.17 kg/m²       General appearance - alert, well appearing, and in no distress  Mental status - affect appropriate to mood  Eyes - sclera anicteric, moist mucous membranes  Neck - supple, no significant adenopathy      Medications:     Current Outpatient Medications   Medication Sig Dispense Refill    metoprolol succinate (TOPROL XL) 25 MG extended release tablet TAKE 1 TABLET BY MOUTH EVERY DAY 90 tablet 3    hydrOXYzine HCl (ATARAX) 25 MG tablet Take by mouth 3 times daily as needed       No

## 2025-03-10 ENCOUNTER — HOSPITAL ENCOUNTER (OUTPATIENT)
Facility: HOSPITAL | Age: 55
Discharge: HOME | End: 2025-03-13
Attending: INTERNAL MEDICINE
Payer: COMMERCIAL

## 2025-03-10 DIAGNOSIS — D86.9 SARCOIDOSIS: ICD-10-CM

## 2025-03-10 PROCEDURE — 71250 CT THORAX DX C-: CPT

## 2025-03-14 ENCOUNTER — TELEPHONE (OUTPATIENT)
Age: 55
End: 2025-03-14

## 2025-03-14 NOTE — TELEPHONE ENCOUNTER
Patient called stating she's experiencing a tingling feeling in her left arm. Scheduled patient 4/11 with Am[facility]. Patient would like something sooner if possible.     # [phone]

## 2025-03-14 NOTE — TELEPHONE ENCOUNTER
Patient called stating she's experiencing a tingling feeling in her left arm. Scheduled patient 4/11 with Aminta. Patient would like something sooner if possible.     # [phone]    #################################  Unable to reach pt. Message left with HIPPA compliant message and call back number  Also sending a [employer] message

## 2025-04-11 ENCOUNTER — OFFICE VISIT (OUTPATIENT)
Age: 55
End: 2025-04-11
Payer: COMMERCIAL

## 2025-04-11 VITALS
WEIGHT: 196 LBS | SYSTOLIC BLOOD PRESSURE: 110 MMHG | BODY MASS INDEX: 30.76 KG/M2 | OXYGEN SATURATION: 98 % | DIASTOLIC BLOOD PRESSURE: 72 MMHG | HEART RATE: 82 BPM | HEIGHT: 67 IN

## 2025-04-11 DIAGNOSIS — R20.2 TINGLING OF LEFT UPPER EXTREMITY: Primary | ICD-10-CM

## 2025-04-11 DIAGNOSIS — I10 ESSENTIAL (PRIMARY) HYPERTENSION: ICD-10-CM

## 2025-04-11 DIAGNOSIS — D86.0 PULMONARY SARCOIDOSIS: ICD-10-CM

## 2025-04-11 PROCEDURE — 93000 ELECTROCARDIOGRAM COMPLETE: CPT

## 2025-04-11 PROCEDURE — 99214 OFFICE O/P EST MOD 30 MIN: CPT

## 2025-04-11 PROCEDURE — 3074F SYST BP LT 130 MM HG: CPT

## 2025-04-11 PROCEDURE — 3078F DIAST BP <80 MM HG: CPT

## 2025-04-11 RX ORDER — KETOCONAZOLE 20 MG/G
CREAM TOPICAL EVERY 24 HOURS
COMMUNITY
Start: 2024-11-19

## 2025-04-11 RX ORDER — CLOBETASOL PROPIONATE 0.5 MG/ML
1 SOLUTION TOPICAL
COMMUNITY
Start: 2025-02-03

## 2025-04-11 RX ORDER — FLUTICASONE FUROATE AND VILANTEROL TRIFENATATE 100; 25 UG/1; UG/1
POWDER RESPIRATORY (INHALATION)
COMMUNITY
Start: 2025-03-24

## 2025-04-11 RX ORDER — FLUTICASONE PROPIONATE AND SALMETEROL 250; 50 UG/1; UG/1
POWDER RESPIRATORY (INHALATION)
COMMUNITY
Start: 2025-04-02

## 2025-04-11 RX ORDER — PREDNISONE 20 MG/1
20 TABLET ORAL DAILY
COMMUNITY
Start: 2025-03-21

## 2025-04-11 ASSESSMENT — PATIENT HEALTH QUESTIONNAIRE - PHQ9
SUM OF ALL RESPONSES TO PHQ QUESTIONS 1-9: 0
SUM OF ALL RESPONSES TO PHQ QUESTIONS 1-9: 0
2. FEELING DOWN, DEPRESSED OR HOPELESS: NOT AT ALL
SUM OF ALL RESPONSES TO PHQ QUESTIONS 1-9: 0
SUM OF ALL RESPONSES TO PHQ QUESTIONS 1-9: 0
1. LITTLE INTEREST OR PLEASURE IN DOING THINGS: NOT AT ALL

## 2025-04-11 NOTE — PROGRESS NOTES
1. Have you been to the ER, urgent care clinic since your last visit?  Hospitalized since your last visit?  No    2. Have you seen or consulted any other health care providers outside of the [facility] since your last visit?  Include any pap smears or colon screening.   Dr. Dexter, [facility]    
kg (196 lb)   SpO2 98%   BMI 30.70 kg/m²     General: alert, cooperative, no distress, appears stated age  Neck: supple, symmetrical, trachea midline, no adenopathy, thyroid: not enlarged, symmetric, no tenderness/mass/nodules, no carotid bruit, and no JVD  Lungs: clear to auscultation bilaterally  Heart: regular rate and rhythm, S1, S2 normal, no murmur, no click, rub or gallop  Abdomen: soft, non tender  Extremities: extremities normal, atraumatic, no cyanosis or edema  Skin: No significant rashes  MSKTL: Overall good ROM ext  Neuro: Grossly intact  Psych: Appropriate affect    LABS / OTHER STUDIES:     No results found for: \"NA\", \"K\", \"CL\", \"CO2\", \"GLU\", \"BUN\", \"GFRAA\", \"TP\", \"GLOB\", \"ALT\", \"AST\"    No results found for: \"CHOL\", \"CHOLPOCT\", \"CHLST\", \"CHOLV\", \"TOTCHOLEXT\", \"HDL\", \"HDLPOC\", \"HDLEXT\", \"HDLC\", \"LDL\", \"LDLCEXT\", \"LDLC\", \"VLDLC\", \"VLDL\", \"TRIGLYCEXT\"    No results found for: \"CHOL\", \"TRIG\", \"HDL\", \"SMALLLDLP\", \"LDLNMR\", \"HDLNMR\", \"TRIGLYNRM\"      CARDIAC DIAGNOSTICS:     Cardiac Evaluation Includes:  I reviewed the test results below.  No results found for this or any previous visit.        No results found for this or any previous visit.      No results found for this or any previous visit.       Future Appointments   Date Time Provider Department Center   3/6/2026  3:40 PM Wilber Mckay MD [facility], APRN-NP  [facility]    [address] [place] [address], [address]  [place] [address] [phone]    [address] [place] [address], [address]  [place] [address] [phone]

## 2025-05-23 RX ORDER — METOPROLOL SUCCINATE 25 MG/1
25 TABLET, EXTENDED RELEASE ORAL DAILY
Qty: 90 TABLET | Refills: 3 | Status: SHIPPED | OUTPATIENT
Start: 2025-05-23

## 2025-05-23 NOTE — TELEPHONE ENCOUNTER
Refill per VO of Dr. Wilber Mckay:    3/3/2025    Future Appointments   Date Time Provider Department Center   3/6/2026  3:40 PM Wilber Mckay MD [facility]       Requested Prescriptions     Pending Prescriptions Disp Refills    metoprolol succinate (TOPROL XL) 25 MG extended release tablet [Pharmacy Med Name: METOPROLOL SUCC ER 25 MG TAB] 90 tablet 3     Sig: TAKE 1 TABLET BY MOUTH EVERY DAY

## 2025-06-11 RX ORDER — METOPROLOL SUCCINATE 25 MG/1
25 TABLET, EXTENDED RELEASE ORAL DAILY
Qty: 90 TABLET | Refills: 3 | Status: SHIPPED | OUTPATIENT
Start: 2025-06-11

## 2025-06-11 NOTE — TELEPHONE ENCOUNTER
Refill per VO of Dr. Lowry  Last appt: 3/3/2025    Future Appointments   Date Time Provider Department Center   3/6/2026  3:40 PM Jenn Lowry, MD [facility]       Requested Prescriptions     Signed Prescriptions Disp Refills    metoprolol succinate (TOPROL XL) 25 MG extended release tablet 90 tablet 3     Sig: Take 1 tablet by mouth daily     Authorizing Provider: JENN LOWRY     Ordering User: BK SANTA